# Patient Record
Sex: MALE | Race: WHITE | NOT HISPANIC OR LATINO | Employment: FULL TIME | URBAN - NONMETROPOLITAN AREA
[De-identification: names, ages, dates, MRNs, and addresses within clinical notes are randomized per-mention and may not be internally consistent; named-entity substitution may affect disease eponyms.]

---

## 2018-08-31 ENCOUNTER — OFFICE VISIT (OUTPATIENT)
Dept: ORTHOPEDIC SURGERY | Facility: CLINIC | Age: 58
End: 2018-08-31

## 2018-08-31 VITALS — BODY MASS INDEX: 25.47 KG/M2 | RESPIRATION RATE: 20 BRPM | WEIGHT: 188 LBS | HEIGHT: 72 IN

## 2018-08-31 DIAGNOSIS — M75.51 ACUTE SHOULDER BURSITIS, RIGHT: Primary | ICD-10-CM

## 2018-08-31 DIAGNOSIS — M75.81 ROTATOR CUFF TENDINITIS, RIGHT: ICD-10-CM

## 2018-08-31 DIAGNOSIS — M75.111 PARTIAL TEAR OF RIGHT ROTATOR CUFF: ICD-10-CM

## 2018-08-31 DIAGNOSIS — M19.019 AC JOINT ARTHROPATHY: ICD-10-CM

## 2018-08-31 PROCEDURE — 20610 DRAIN/INJ JOINT/BURSA W/O US: CPT | Performed by: PHYSICIAN ASSISTANT

## 2018-08-31 PROCEDURE — 99203 OFFICE O/P NEW LOW 30 MIN: CPT | Performed by: PHYSICIAN ASSISTANT

## 2018-08-31 RX ORDER — HYDROCODONE BITARTRATE AND ACETAMINOPHEN 7.5; 325 MG/1; MG/1
1 TABLET ORAL EVERY 6 HOURS PRN
COMMUNITY
End: 2022-01-13

## 2018-08-31 RX ORDER — OMEPRAZOLE 20 MG/1
20 CAPSULE, DELAYED RELEASE ORAL DAILY
COMMUNITY
End: 2022-01-13 | Stop reason: DRUGHIGH

## 2018-08-31 RX ORDER — LIDOCAINE HYDROCHLORIDE 10 MG/ML
2 INJECTION, SOLUTION INFILTRATION; PERINEURAL
Status: COMPLETED | OUTPATIENT
Start: 2018-08-31 | End: 2018-08-31

## 2018-08-31 RX ORDER — METHYLPREDNISOLONE ACETATE 40 MG/ML
40 INJECTION, SUSPENSION INTRA-ARTICULAR; INTRALESIONAL; INTRAMUSCULAR; SOFT TISSUE
Status: COMPLETED | OUTPATIENT
Start: 2018-08-31 | End: 2018-08-31

## 2018-08-31 RX ADMIN — LIDOCAINE HYDROCHLORIDE 2 ML: 10 INJECTION, SOLUTION INFILTRATION; PERINEURAL at 11:34

## 2018-08-31 RX ADMIN — METHYLPREDNISOLONE ACETATE 40 MG: 40 INJECTION, SUSPENSION INTRA-ARTICULAR; INTRALESIONAL; INTRAMUSCULAR; SOFT TISSUE at 11:34

## 2018-10-11 ENCOUNTER — OFFICE VISIT (OUTPATIENT)
Dept: ORTHOPEDIC SURGERY | Facility: CLINIC | Age: 58
End: 2018-10-11

## 2018-10-11 VITALS — WEIGHT: 192.2 LBS | RESPIRATION RATE: 18 BRPM | BODY MASS INDEX: 26.03 KG/M2 | HEIGHT: 72 IN

## 2018-10-11 DIAGNOSIS — M19.019 AC JOINT ARTHROPATHY: ICD-10-CM

## 2018-10-11 DIAGNOSIS — M75.51 ACUTE SHOULDER BURSITIS, RIGHT: Primary | ICD-10-CM

## 2018-10-11 DIAGNOSIS — M75.111 PARTIAL TEAR OF RIGHT ROTATOR CUFF: ICD-10-CM

## 2018-10-11 DIAGNOSIS — M75.81 ROTATOR CUFF TENDINITIS, RIGHT: ICD-10-CM

## 2018-10-11 PROCEDURE — 99213 OFFICE O/P EST LOW 20 MIN: CPT | Performed by: PHYSICIAN ASSISTANT

## 2018-10-11 NOTE — PROGRESS NOTES
Subjective   Patient ID: Alec Chilel is a 58 y.o. right hand dominant male  Follow-up of the Right Shoulder (Patient here today to follow up right shoulder pain. He is still doing therapy with [facility]. C/O severe pain when reaching behind or raising up and back with his right arm)         History of Present Illness  Patient is following up at his scheduled appointment in regards to right shoulder pain.  Patient has been dealing with shoulder pain since July 2018.  While Working as an  at work he raised his right arm above his head and felt an immediate painful popping sensation.  He has since tried formal physical therapy as well as a cortisone injection into the right shoulder.  He states he is minimally improved but still has significant pain with certain movements of the arm.                                                 Past Medical History:   Diagnosis Date   • Rotator cuff syndrome         Past Surgical History:   Procedure Laterality Date   • SHOULDER SURGERY         No family history on file.    Social History     Social History   • Marital status: Single     Spouse name: N/A   • Number of children: N/A   • Years of education: N/A     Occupational History   • Not on file.     Social History Main Topics   • Smoking status: Current Every Day Smoker   • Smokeless tobacco: Never Used   • Alcohol use No   • Drug use: No   • Sexual activity: Defer     Other Topics Concern   • Not on file     Social History Narrative   • No narrative on file         Current Outpatient Prescriptions:   •  omeprazole (priLOSEC) 20 MG capsule, Take 20 mg by mouth Daily., Disp: , Rfl:   •  HYDROcodone-acetaminophen (NORCO) 7.5-325 MG per tablet, Take 1 tablet by mouth Every 6 (Six) Hours As Needed for Moderate Pain ., Disp: , Rfl:     Allergies   Allergen Reactions   • Penicillins Hives       Review of Systems   Constitutional: Negative for fever.   HENT: Negative for voice change.    Eyes: Negative for visual  "disturbance.   Respiratory: Negative for shortness of breath.    Cardiovascular: Negative for chest pain.   Gastrointestinal: Negative for abdominal distention and abdominal pain.   Genitourinary: Negative for dysuria.   Musculoskeletal: Positive for arthralgias. Negative for gait problem and joint swelling.   Skin: Negative for rash.   Neurological: Negative for speech difficulty.   Hematological: Does not bruise/bleed easily.   Psychiatric/Behavioral: Negative for confusion.       Objective   Resp 18   Ht 182.9 cm (72\")   Wt 87.2 kg (192 lb 3.2 oz)   BMI 26.07 kg/m²    Physical Exam   Constitutional: He is oriented to person, place, and time. He appears well-nourished.   Cardiovascular: Normal rate.    Pulmonary/Chest: Effort normal. No respiratory distress.   Abdominal: He exhibits no distension.   Musculoskeletal:        Right shoulder: He exhibits decreased range of motion (painful abduction) and tenderness. He exhibits no bony tenderness.        Right hand: He exhibits normal capillary refill and no swelling. Normal sensation noted. Normal strength noted. He exhibits no thumb/finger opposition.   Neurological: He is alert and oriented to person, place, and time.   Skin: Capillary refill takes less than 2 seconds. No rash noted.   Psychiatric: He has a normal mood and affect.   Vitals reviewed.    Right Shoulder Exam     Range of Motion   Active Abduction: 120   Forward Flexion: 130   Internal Rotation 0 degrees: Sacrum     Muscle Strength   The patient has normal right shoulder strength.    Tests   Cross Arm: positive  Hawkin's test: positive  Impingement: positive    Other   Erythema: absent  Sensation: normal  Pulse: present             Neurologic Exam     Mental Status   Oriented to person, place, and time.      Right Shoulder Exam     Muscle Strength   Normal right shoulder strength               Assessment/Plan   Independent Review of Radiographic Studies:    Patient did have an MRI at [place] [address] " health.  It did reveal a partial thickness tear of the rotator cuff tendon, significant acromioclavicular joint arthropathy There is also subacromial bursitis.      Procedures       There are no diagnoses linked to this encounter.   Orthopedic activities reviewed and patient expressed appreciation  Discussion of orthopedic goals  Risk, benefits, and merits of treatment alternatives reviewed with the patient and questions answered  The nature of the proposed surgery reviewed with the patient including risks, benefits, rehabilitation, recovery timeframe, and outcome expectations    Recommendations/Plan:  Surgery: Surgery proposed at this visit as noted.  Patient is encouraged to call or return for any issues or concerns.    PLAN: Right shoulder arthroscopy, subacromial decompression, distal clavicle excision and acromioplasty.  Possible rotator cuff repair with related procedures    Patient agreeable to call or return sooner for any concerns.

## 2018-11-13 ENCOUNTER — PREP FOR SURGERY (OUTPATIENT)
Dept: OTHER | Facility: HOSPITAL | Age: 58
End: 2018-11-13

## 2018-11-13 DIAGNOSIS — M75.41 SHOULDER IMPINGEMENT SYNDROME, RIGHT: Primary | ICD-10-CM

## 2018-11-13 RX ORDER — CLINDAMYCIN PHOSPHATE 900 MG/50ML
900 INJECTION, SOLUTION INTRAVENOUS
Status: CANCELLED | OUTPATIENT
Start: 2018-12-13 | End: 2018-12-14

## 2018-11-29 ENCOUNTER — HOSPITAL ENCOUNTER (OUTPATIENT)
Dept: GENERAL RADIOLOGY | Facility: HOSPITAL | Age: 58
Discharge: HOME | End: 2018-11-29
Admitting: INTERNAL MEDICINE

## 2018-11-29 ENCOUNTER — OFFICE VISIT (OUTPATIENT)
Dept: ORTHOPEDIC SURGERY | Facility: CLINIC | Age: 58
End: 2018-11-29

## 2018-11-29 ENCOUNTER — APPOINTMENT (OUTPATIENT)
Dept: PREADMISSION TESTING | Facility: HOSPITAL | Age: 58
End: 2018-11-29

## 2018-11-29 VITALS — RESPIRATION RATE: 18 BRPM | WEIGHT: 197 LBS | HEIGHT: 72 IN | BODY MASS INDEX: 26.68 KG/M2

## 2018-11-29 VITALS
HEART RATE: 74 BPM | SYSTOLIC BLOOD PRESSURE: 112 MMHG | BODY MASS INDEX: 26.68 KG/M2 | OXYGEN SATURATION: 98 % | DIASTOLIC BLOOD PRESSURE: 67 MMHG | WEIGHT: 197 LBS | HEIGHT: 72 IN

## 2018-11-29 DIAGNOSIS — M75.51 ACUTE SHOULDER BURSITIS, RIGHT: Primary | ICD-10-CM

## 2018-11-29 DIAGNOSIS — M19.019 AC JOINT ARTHROPATHY: ICD-10-CM

## 2018-11-29 DIAGNOSIS — M75.41 SHOULDER IMPINGEMENT SYNDROME, RIGHT: ICD-10-CM

## 2018-11-29 DIAGNOSIS — M75.111 PARTIAL TEAR OF RIGHT ROTATOR CUFF: ICD-10-CM

## 2018-11-29 DIAGNOSIS — M75.81 ROTATOR CUFF TENDINITIS, RIGHT: ICD-10-CM

## 2018-11-29 LAB
ALBUMIN SERPL-MCNC: 4.9 G/DL (ref 3.5–5)
ALBUMIN/GLOB SERPL: 1.8 G/DL (ref 1–2)
ALP SERPL-CCNC: 62 U/L (ref 38–126)
ALT SERPL W P-5'-P-CCNC: 55 U/L (ref 13–69)
ANION GAP SERPL CALCULATED.3IONS-SCNC: 8.1 MMOL/L (ref 10–20)
AST SERPL-CCNC: 34 U/L (ref 15–46)
BASOPHILS # BLD AUTO: 0.05 10*3/MM3 (ref 0–0.2)
BASOPHILS NFR BLD AUTO: 0.7 % (ref 0–2.5)
BILIRUB SERPL-MCNC: 0.6 MG/DL (ref 0.2–1.3)
BUN BLD-MCNC: 19 MG/DL (ref 7–20)
BUN/CREAT SERPL: 21.1 (ref 6.3–21.9)
CALCIUM SPEC-SCNC: 9.4 MG/DL (ref 8.4–10.2)
CHLORIDE SERPL-SCNC: 105 MMOL/L (ref 98–107)
CO2 SERPL-SCNC: 29 MMOL/L (ref 26–30)
CREAT BLD-MCNC: 0.9 MG/DL (ref 0.6–1.3)
DEPRECATED RDW RBC AUTO: 41.1 FL (ref 37–54)
EOSINOPHIL # BLD AUTO: 0.32 10*3/MM3 (ref 0–0.7)
EOSINOPHIL NFR BLD AUTO: 4.3 % (ref 0–7)
ERYTHROCYTE [DISTWIDTH] IN BLOOD BY AUTOMATED COUNT: 11.9 % (ref 11.5–14.5)
GFR SERPL CREATININE-BSD FRML MDRD: 87 ML/MIN/1.73
GLOBULIN UR ELPH-MCNC: 2.8 GM/DL
GLUCOSE BLD-MCNC: 101 MG/DL (ref 74–98)
HCT VFR BLD AUTO: 45.3 % (ref 42–52)
HGB BLD-MCNC: 15.8 G/DL (ref 14–18)
IMM GRANULOCYTES # BLD: 0.04 10*3/MM3 (ref 0–0.06)
IMM GRANULOCYTES NFR BLD: 0.5 % (ref 0–0.6)
LYMPHOCYTES # BLD AUTO: 1.78 10*3/MM3 (ref 0.6–3.4)
LYMPHOCYTES NFR BLD AUTO: 24.1 % (ref 10–50)
MCH RBC QN AUTO: 32.6 PG (ref 27–31)
MCHC RBC AUTO-ENTMCNC: 34.9 G/DL (ref 30–37)
MCV RBC AUTO: 93.6 FL (ref 80–94)
MONOCYTES # BLD AUTO: 1.06 10*3/MM3 (ref 0–0.9)
MONOCYTES NFR BLD AUTO: 14.3 % (ref 0–12)
NEUTROPHILS # BLD AUTO: 4.14 10*3/MM3 (ref 2–6.9)
NEUTROPHILS NFR BLD AUTO: 56.1 % (ref 37–80)
NRBC BLD MANUAL-RTO: 0 /100 WBC (ref 0–0)
PLATELET # BLD AUTO: 171 10*3/MM3 (ref 130–400)
PMV BLD AUTO: 9.1 FL (ref 6–12)
POTASSIUM BLD-SCNC: 4.1 MMOL/L (ref 3.5–5.1)
PROT SERPL-MCNC: 7.7 G/DL (ref 6.3–8.2)
RBC # BLD AUTO: 4.84 10*6/MM3 (ref 4.7–6.1)
SODIUM BLD-SCNC: 138 MMOL/L (ref 137–145)
WBC NRBC COR # BLD: 7.39 10*3/MM3 (ref 4.8–10.8)

## 2018-11-29 PROCEDURE — S0260 H&P FOR SURGERY: HCPCS | Performed by: PHYSICIAN ASSISTANT

## 2018-11-29 PROCEDURE — 71046 X-RAY EXAM CHEST 2 VIEWS: CPT

## 2018-11-29 PROCEDURE — 85025 COMPLETE CBC W/AUTO DIFF WBC: CPT | Performed by: PHYSICIAN ASSISTANT

## 2018-11-29 PROCEDURE — 93005 ELECTROCARDIOGRAM TRACING: CPT | Performed by: PHYSICIAN ASSISTANT

## 2018-11-29 PROCEDURE — 87081 CULTURE SCREEN ONLY: CPT | Performed by: PHYSICIAN ASSISTANT

## 2018-11-29 PROCEDURE — 80053 COMPREHEN METABOLIC PANEL: CPT | Performed by: PHYSICIAN ASSISTANT

## 2018-11-29 PROCEDURE — 36415 COLL VENOUS BLD VENIPUNCTURE: CPT

## 2018-11-29 NOTE — PROGRESS NOTES
Subjective   Patient ID: Alec Chilel is a 58 y.o. right hand dominant male  Pain and Pre-op Exam of the Right Shoulder (ATS, SAD, DCE, Acromioplasty scheduled 12/13/18)         History of Present Illness  Patient presents for his preoperative evaluation regarding right shoulder arthroscopy.  Patient has been dealing with right shoulder pain since July 2018.  While working as an  he raced his right arm above his head when he developed an immediate painful popping sensation.  Since the injury he has tried formal physical therapy, cortisone injection, anti-inflammatories and his chronic hydrocodone with no relief.                                                   Past Medical History:   Diagnosis Date   • Rotator cuff syndrome         Past Surgical History:   Procedure Laterality Date   • SHOULDER SURGERY         History reviewed. No pertinent family history.    Social History     Socioeconomic History   • Marital status: Single     Spouse name: Not on file   • Number of children: Not on file   • Years of education: Not on file   • Highest education level: Not on file   Social Needs   • Financial resource strain: Not on file   • Food insecurity - worry: Not on file   • Food insecurity - inability: Not on file   • Transportation needs - medical: Not on file   • Transportation needs - non-medical: Not on file   Occupational History   • Not on file   Tobacco Use   • Smoking status: Current Every Day Smoker   • Smokeless tobacco: Never Used   Substance and Sexual Activity   • Alcohol use: No   • Drug use: No   • Sexual activity: Defer   Other Topics Concern   • Not on file   Social History Narrative   • Not on file         Current Outpatient Medications:   •  HYDROcodone-acetaminophen (NORCO) 7.5-325 MG per tablet, Take 1 tablet by mouth Every 6 (Six) Hours As Needed for Moderate Pain ., Disp: , Rfl:   •  omeprazole (priLOSEC) 20 MG capsule, Take 20 mg by mouth Daily., Disp: , Rfl:     Allergies   Allergen  "Reactions   • Penicillins Hives       Review of Systems   Constitutional: Negative.    Musculoskeletal: Positive for arthralgias (right shoulder).   Skin: Negative.    Allergic/Immunologic: Negative.        Objective   Resp 18   Ht 182.9 cm (72\")   Wt 89.4 kg (197 lb)   BMI 26.72 kg/m²    Physical Exam   Constitutional: He is oriented to person, place, and time. He appears well-nourished.   Eyes: Conjunctivae are normal.   Neck: No tracheal deviation present.   Cardiovascular: Normal rate.   Pulmonary/Chest: Effort normal.   Abdominal: He exhibits no distension.   Musculoskeletal:        Right shoulder: He exhibits decreased range of motion, tenderness and crepitus.   Neurological: He is alert and oriented to person, place, and time.   Skin: Capillary refill takes less than 2 seconds.   Psychiatric: He has a normal mood and affect.   Vitals reviewed.    Right Shoulder Exam     Range of Motion   Active abduction: 120   Right shoulder forward flexion: 125.     Muscle Strength   The patient has normal right shoulder strength.             Neurologic Exam     Mental Status   Oriented to person, place, and time.              Assessment/Plan   Independent Review of Radiographic Studies:    No new imaging done today.  Reviewed with patient at a prior visit.  MRI of the right shoulder from [facility] did reveal partial thickness tear of the rotator cuff tendon, significant acromioclavicular joint arthropathy, subacromial bursitis    Procedures       Alec was seen today for pain and pre-op exam.    Diagnoses and all orders for this visit:    Acute shoulder bursitis, right    Rotator cuff tendinitis, right    Partial tear of right rotator cuff    AC joint arthropathy       Orthopedic activities reviewed and patient expressed appreciation  Discussion of orthopedic goals  Risk, benefits, and merits of treatment alternatives reviewed with the patient and questions answered  The nature of the proposed surgery reviewed " with the patient including risks, benefits, rehabilitation, recovery timeframe, and outcome expectations    Recommendations/Plan:  Surgery: Surgery proposed at this visit as noted.  Patient is encouraged to call or return for any issues or concerns.     Patient states he does have hydrocodone 7.5 mg which he can take up to 3 times per day but for the last few months he has only taken 1 pill twice a day.  He understands that we will have him take the medication prescribed from his pain management doctor 3 times per day and we will add anti-inflammatories as needed along with muscle relaxers  PLAN: Right shoulder arthroscopy, subacromial decompression, distal clavicle excision, acromioplasty with related procedures.  We'll explore rotator cuff tendons and repair if warranted  Patient agreeable to call or return sooner for any concerns.

## 2018-12-02 LAB — MRSA SPEC QL CULT: NORMAL

## 2018-12-13 ENCOUNTER — ANESTHESIA EVENT (OUTPATIENT)
Dept: PERIOP | Facility: HOSPITAL | Age: 58
End: 2018-12-13

## 2018-12-13 ENCOUNTER — ANESTHESIA (OUTPATIENT)
Dept: PERIOP | Facility: HOSPITAL | Age: 58
End: 2018-12-13

## 2018-12-13 ENCOUNTER — HOSPITAL ENCOUNTER (OUTPATIENT)
Facility: HOSPITAL | Age: 58
Setting detail: HOSPITAL OUTPATIENT SURGERY
Discharge: HOME | End: 2018-12-13
Attending: ORTHOPAEDIC SURGERY | Admitting: ORTHOPAEDIC SURGERY

## 2018-12-13 VITALS
DIASTOLIC BLOOD PRESSURE: 58 MMHG | TEMPERATURE: 97.3 F | SYSTOLIC BLOOD PRESSURE: 155 MMHG | HEART RATE: 64 BPM | RESPIRATION RATE: 18 BRPM | OXYGEN SATURATION: 95 %

## 2018-12-13 PROCEDURE — 25010000002 PROPOFOL 200 MG/20ML EMULSION: Performed by: NURSE ANESTHETIST, CERTIFIED REGISTERED

## 2018-12-13 PROCEDURE — C1713 ANCHOR/SCREW BN/BN,TIS/BN: HCPCS | Performed by: ORTHOPAEDIC SURGERY

## 2018-12-13 PROCEDURE — 25010000002 DEXAMETHASONE PER 1 MG: Performed by: NURSE ANESTHETIST, CERTIFIED REGISTERED

## 2018-12-13 PROCEDURE — 25010000002 ONDANSETRON PER 1 MG: Performed by: NURSE ANESTHETIST, CERTIFIED REGISTERED

## 2018-12-13 PROCEDURE — 23412 REPAIR ROTATOR CUFF CHRONIC: CPT | Performed by: ORTHOPAEDIC SURGERY

## 2018-12-13 PROCEDURE — 25010000002 EPINEPHRINE PER 0.1 MG: Performed by: ORTHOPAEDIC SURGERY

## 2018-12-13 PROCEDURE — 25010000002 FENTANYL CITRATE (PF) 250 MCG/5ML SOLUTION: Performed by: NURSE ANESTHETIST, CERTIFIED REGISTERED

## 2018-12-13 PROCEDURE — 25010000002 MIDAZOLAM PER 1 MG: Performed by: NURSE ANESTHETIST, CERTIFIED REGISTERED

## 2018-12-13 PROCEDURE — 25010000002 FENTANYL CITRATE (PF) 100 MCG/2ML SOLUTION: Performed by: NURSE ANESTHETIST, CERTIFIED REGISTERED

## 2018-12-13 DEVICE — HEALIX BR ANCHOR W/ORTHOCORD TCP/PLGA ABSORBABLE ANCHOR (1) VIOLET (1) BLUE STRAND, SIZE 2 (5 METRIC) ORTHOCORD BRAIDED COMPOSITE SUTURE, 36 INCHES (91CM) 4.5MM
Type: IMPLANTABLE DEVICE | Site: SHOULDER | Status: FUNCTIONAL
Brand: ORTHOCORD HEALIX BR

## 2018-12-13 RX ORDER — PROMETHAZINE HYDROCHLORIDE 25 MG/ML
6.25 INJECTION, SOLUTION INTRAMUSCULAR; INTRAVENOUS ONCE AS NEEDED
Status: DISCONTINUED | OUTPATIENT
Start: 2018-12-13 | End: 2018-12-13 | Stop reason: HOSPADM

## 2018-12-13 RX ORDER — BUPIVACAINE HYDROCHLORIDE 5 MG/ML
INJECTION, SOLUTION EPIDURAL; INTRACAUDAL
Status: COMPLETED | OUTPATIENT
Start: 2018-12-13 | End: 2018-12-13

## 2018-12-13 RX ORDER — DEXAMETHASONE SODIUM PHOSPHATE 10 MG/ML
INJECTION, SOLUTION INTRAMUSCULAR; INTRAVENOUS
Status: DISCONTINUED
Start: 2018-12-13 | End: 2018-12-13 | Stop reason: HOSPADM

## 2018-12-13 RX ORDER — MIDAZOLAM HYDROCHLORIDE 1 MG/ML
INJECTION INTRAMUSCULAR; INTRAVENOUS
Status: COMPLETED
Start: 2018-12-13 | End: 2018-12-13

## 2018-12-13 RX ORDER — ROCURONIUM BROMIDE 10 MG/ML
INJECTION, SOLUTION INTRAVENOUS AS NEEDED
Status: DISCONTINUED | OUTPATIENT
Start: 2018-12-13 | End: 2018-12-13 | Stop reason: SURG

## 2018-12-13 RX ORDER — MIDAZOLAM HYDROCHLORIDE 1 MG/ML
INJECTION INTRAMUSCULAR; INTRAVENOUS AS NEEDED
Status: DISCONTINUED | OUTPATIENT
Start: 2018-12-13 | End: 2018-12-13 | Stop reason: SURG

## 2018-12-13 RX ORDER — LORAZEPAM 2 MG/ML
0.5 INJECTION INTRAMUSCULAR ONCE
Status: DISCONTINUED | OUTPATIENT
Start: 2018-12-13 | End: 2018-12-13 | Stop reason: HOSPADM

## 2018-12-13 RX ORDER — SODIUM CHLORIDE 0.9 % (FLUSH) 0.9 %
3 SYRINGE (ML) INJECTION AS NEEDED
Status: DISCONTINUED | OUTPATIENT
Start: 2018-12-13 | End: 2018-12-13 | Stop reason: HOSPADM

## 2018-12-13 RX ORDER — LIDOCAINE HYDROCHLORIDE 20 MG/ML
INJECTION, SOLUTION EPIDURAL; INFILTRATION; INTRACAUDAL; PERINEURAL
Status: COMPLETED | OUTPATIENT
Start: 2018-12-13 | End: 2018-12-13

## 2018-12-13 RX ORDER — FENTANYL CITRATE 50 UG/ML
INJECTION, SOLUTION INTRAMUSCULAR; INTRAVENOUS AS NEEDED
Status: DISCONTINUED | OUTPATIENT
Start: 2018-12-13 | End: 2018-12-13 | Stop reason: SURG

## 2018-12-13 RX ORDER — DEXAMETHASONE SODIUM PHOSPHATE 4 MG/ML
INJECTION, SOLUTION INTRA-ARTICULAR; INTRALESIONAL; INTRAMUSCULAR; INTRAVENOUS; SOFT TISSUE AS NEEDED
Status: DISCONTINUED | OUTPATIENT
Start: 2018-12-13 | End: 2018-12-13 | Stop reason: SURG

## 2018-12-13 RX ORDER — LIDOCAINE HYDROCHLORIDE AND EPINEPHRINE 10; 10 MG/ML; UG/ML
INJECTION, SOLUTION INFILTRATION; PERINEURAL AS NEEDED
Status: DISCONTINUED | OUTPATIENT
Start: 2018-12-13 | End: 2018-12-13 | Stop reason: HOSPADM

## 2018-12-13 RX ORDER — BUPIVACAINE HCL/0.9 % NACL/PF 0.125 %
6 PREFILLED PUMP RESERVOIR EPIDURAL CONTINUOUS
Status: DISCONTINUED | OUTPATIENT
Start: 2018-12-13 | End: 2018-12-13 | Stop reason: HOSPADM

## 2018-12-13 RX ORDER — SODIUM CHLORIDE, SODIUM LACTATE, POTASSIUM CHLORIDE, CALCIUM CHLORIDE 600; 310; 30; 20 MG/100ML; MG/100ML; MG/100ML; MG/100ML
1000 INJECTION, SOLUTION INTRAVENOUS CONTINUOUS
Status: DISCONTINUED | OUTPATIENT
Start: 2018-12-13 | End: 2018-12-13 | Stop reason: HOSPADM

## 2018-12-13 RX ORDER — NEOSTIGMINE METHYLSULFATE 5 MG/5 ML
SYRINGE (ML) INTRAVENOUS AS NEEDED
Status: DISCONTINUED | OUTPATIENT
Start: 2018-12-13 | End: 2018-12-13 | Stop reason: SURG

## 2018-12-13 RX ORDER — ONDANSETRON 2 MG/ML
INJECTION INTRAMUSCULAR; INTRAVENOUS AS NEEDED
Status: DISCONTINUED | OUTPATIENT
Start: 2018-12-13 | End: 2018-12-13 | Stop reason: SURG

## 2018-12-13 RX ORDER — ALBUTEROL SULFATE 2.5 MG/3ML
2.5 SOLUTION RESPIRATORY (INHALATION) ONCE AS NEEDED
Status: DISCONTINUED | OUTPATIENT
Start: 2018-12-13 | End: 2018-12-13 | Stop reason: HOSPADM

## 2018-12-13 RX ORDER — CLINDAMYCIN PHOSPHATE 900 MG/50ML
900 INJECTION, SOLUTION INTRAVENOUS
Status: COMPLETED | OUTPATIENT
Start: 2018-12-13 | End: 2018-12-13

## 2018-12-13 RX ORDER — MEPERIDINE HYDROCHLORIDE 50 MG/ML
12.5 INJECTION INTRAMUSCULAR; INTRAVENOUS; SUBCUTANEOUS
Status: DISCONTINUED | OUTPATIENT
Start: 2018-12-13 | End: 2018-12-13 | Stop reason: HOSPADM

## 2018-12-13 RX ORDER — PROPOFOL 10 MG/ML
INJECTION, EMULSION INTRAVENOUS AS NEEDED
Status: DISCONTINUED | OUTPATIENT
Start: 2018-12-13 | End: 2018-12-13 | Stop reason: SURG

## 2018-12-13 RX ORDER — KETAMINE HYDROCHLORIDE 50 MG/ML
INJECTION, SOLUTION, CONCENTRATE INTRAMUSCULAR; INTRAVENOUS AS NEEDED
Status: DISCONTINUED | OUTPATIENT
Start: 2018-12-13 | End: 2018-12-13 | Stop reason: SURG

## 2018-12-13 RX ORDER — HYDROCODONE BITARTRATE AND ACETAMINOPHEN 7.5; 325 MG/1; MG/1
1 TABLET ORAL EVERY 6 HOURS PRN
Qty: 28 TABLET | Refills: 0 | OUTPATIENT
Start: 2018-12-13 | End: 2018-12-13 | Stop reason: HOSPADM

## 2018-12-13 RX ORDER — BUPIVACAINE HCL/0.9 % NACL/PF 0.125 %
PLASTIC BAG, INJECTION (ML) EPIDURAL AS NEEDED
Status: DISCONTINUED | OUTPATIENT
Start: 2018-12-13 | End: 2018-12-13 | Stop reason: SURG

## 2018-12-13 RX ORDER — PROMETHAZINE HYDROCHLORIDE 25 MG/1
25 TABLET ORAL ONCE AS NEEDED
Status: DISCONTINUED | OUTPATIENT
Start: 2018-12-13 | End: 2018-12-13 | Stop reason: HOSPADM

## 2018-12-13 RX ORDER — ONDANSETRON 2 MG/ML
4 INJECTION INTRAMUSCULAR; INTRAVENOUS ONCE AS NEEDED
Status: DISCONTINUED | OUTPATIENT
Start: 2018-12-13 | End: 2018-12-13 | Stop reason: HOSPADM

## 2018-12-13 RX ORDER — BUPIVACAINE HYDROCHLORIDE 5 MG/ML
INJECTION, SOLUTION EPIDURAL; INTRACAUDAL
Status: COMPLETED
Start: 2018-12-13 | End: 2018-12-13

## 2018-12-13 RX ORDER — GLYCOPYRROLATE 0.2 MG/ML
INJECTION INTRAMUSCULAR; INTRAVENOUS AS NEEDED
Status: DISCONTINUED | OUTPATIENT
Start: 2018-12-13 | End: 2018-12-13 | Stop reason: SURG

## 2018-12-13 RX ORDER — EPINEPHRINE 1 MG/ML
INJECTION, SOLUTION, CONCENTRATE INTRAVENOUS AS NEEDED
Status: DISCONTINUED | OUTPATIENT
Start: 2018-12-13 | End: 2018-12-13 | Stop reason: HOSPADM

## 2018-12-13 RX ORDER — CLINDAMYCIN PHOSPHATE 150 MG/ML
INJECTION, SOLUTION INTRAVENOUS AS NEEDED
Status: DISCONTINUED | OUTPATIENT
Start: 2018-12-13 | End: 2018-12-13 | Stop reason: HOSPADM

## 2018-12-13 RX ORDER — ESMOLOL HYDROCHLORIDE 10 MG/ML
INJECTION INTRAVENOUS AS NEEDED
Status: DISCONTINUED | OUTPATIENT
Start: 2018-12-13 | End: 2018-12-13 | Stop reason: SURG

## 2018-12-13 RX ORDER — PROMETHAZINE HYDROCHLORIDE 25 MG/1
25 SUPPOSITORY RECTAL ONCE AS NEEDED
Status: DISCONTINUED | OUTPATIENT
Start: 2018-12-13 | End: 2018-12-13 | Stop reason: HOSPADM

## 2018-12-13 RX ADMIN — FENTANYL CITRATE 50 MCG: 50 INJECTION, SOLUTION INTRAMUSCULAR; INTRAVENOUS at 11:06

## 2018-12-13 RX ADMIN — MIDAZOLAM HYDROCHLORIDE 2 MG: 1 INJECTION, SOLUTION INTRAMUSCULAR; INTRAVENOUS at 10:20

## 2018-12-13 RX ADMIN — Medication 6 ML/HR: at 12:40

## 2018-12-13 RX ADMIN — KETAMINE HYDROCHLORIDE 50 MG: 50 INJECTION, SOLUTION INTRAMUSCULAR; INTRAVENOUS at 10:20

## 2018-12-13 RX ADMIN — PROPOFOL 20 MG: 10 INJECTION, EMULSION INTRAVENOUS at 10:25

## 2018-12-13 RX ADMIN — FAMOTIDINE 20 MG: 10 INJECTION, SOLUTION INTRAVENOUS at 08:12

## 2018-12-13 RX ADMIN — GLYCOPYRROLATE 0.4 MG: 0.2 INJECTION, SOLUTION INTRAMUSCULAR; INTRAVENOUS at 11:43

## 2018-12-13 RX ADMIN — ROCURONIUM BROMIDE 30 MG: 10 INJECTION INTRAVENOUS at 10:20

## 2018-12-13 RX ADMIN — DEXAMETHASONE SODIUM PHOSPHATE 8 MG: 4 INJECTION, SOLUTION INTRAMUSCULAR; INTRAVENOUS at 10:20

## 2018-12-13 RX ADMIN — SODIUM CHLORIDE, POTASSIUM CHLORIDE, SODIUM LACTATE AND CALCIUM CHLORIDE: 600; 310; 30; 20 INJECTION, SOLUTION INTRAVENOUS at 11:05

## 2018-12-13 RX ADMIN — Medication 150 MCG: at 11:35

## 2018-12-13 RX ADMIN — FENTANYL CITRATE 100 MCG: 50 INJECTION, SOLUTION INTRAMUSCULAR; INTRAVENOUS at 10:20

## 2018-12-13 RX ADMIN — KETAMINE HYDROCHLORIDE 50 MG: 50 INJECTION, SOLUTION INTRAMUSCULAR; INTRAVENOUS at 10:29

## 2018-12-13 RX ADMIN — PROPOFOL 180 MG: 10 INJECTION, EMULSION INTRAVENOUS at 10:20

## 2018-12-13 RX ADMIN — LIDOCAINE HYDROCHLORIDE 1 ML: 20 INJECTION, SOLUTION EPIDURAL; INFILTRATION; INTRACAUDAL; PERINEURAL at 09:55

## 2018-12-13 RX ADMIN — FENTANYL CITRATE 100 MCG: 50 INJECTION, SOLUTION INTRAMUSCULAR; INTRAVENOUS at 10:50

## 2018-12-13 RX ADMIN — FENTANYL CITRATE 100 MCG: 50 INJECTION, SOLUTION INTRAMUSCULAR; INTRAVENOUS at 11:48

## 2018-12-13 RX ADMIN — ONDANSETRON 4 MG: 2 INJECTION INTRAMUSCULAR; INTRAVENOUS at 10:20

## 2018-12-13 RX ADMIN — FENTANYL CITRATE 100 MCG: 50 INJECTION, SOLUTION INTRAMUSCULAR; INTRAVENOUS at 10:24

## 2018-12-13 RX ADMIN — MIDAZOLAM HYDROCHLORIDE 2 MG: 1 INJECTION, SOLUTION INTRAMUSCULAR; INTRAVENOUS at 09:54

## 2018-12-13 RX ADMIN — BUPIVACAINE HYDROCHLORIDE 20 ML: 5 INJECTION, SOLUTION EPIDURAL; INTRACAUDAL; PERINEURAL at 09:55

## 2018-12-13 RX ADMIN — SODIUM CHLORIDE, POTASSIUM CHLORIDE, SODIUM LACTATE AND CALCIUM CHLORIDE 1000 ML: 600; 310; 30; 20 INJECTION, SOLUTION INTRAVENOUS at 08:11

## 2018-12-13 RX ADMIN — ESMOLOL HYDROCHLORIDE 10 MG: 10 INJECTION, SOLUTION INTRAVENOUS at 11:45

## 2018-12-13 RX ADMIN — CLINDAMYCIN PHOSPHATE 900 MG: 900 INJECTION, SOLUTION INTRAVENOUS at 10:10

## 2018-12-13 RX ADMIN — ESMOLOL HYDROCHLORIDE 10 MG: 10 INJECTION, SOLUTION INTRAVENOUS at 10:50

## 2018-12-13 RX ADMIN — Medication 3.5 MG: at 11:43

## 2018-12-13 NOTE — DISCHARGE INSTRUCTIONS
"ON-Q PATIENT INSTRUCTIONS    You have had regional anesthesia with a catheter as part of your anesthetic care.    Because of this your extremity may be numb and very weak.  You must protect   your extremity.  Wear the sling if your surgeon has given you one.  Take care to   avoid hot, very cold or sharp items.  As the block wears off, you may have a tingling   or a \"pins and needles\" sensation in your arm and hand.  This is normal.    The ON-Q pain pump is infusing medicine all the time which will help control your   pain as the block wears off.  Your extremity may not be as numb after the first 12 to   18 hours.    Do not tug on or kink the catheter.  Keep the insertion site into the skin and any   connections clean.    CALL ANESTHESIA  IMMEDIATELY FOR:     -A metallic taste in your mouth       -Ringing in the ears        -Persistent tremors or shakes or a seizure      -Redness, pain or swelling at the catheter insertion site    -A cold, dusky or dark extremity   -Severe pain and you can't move your fingers or toes    The ON-Q pain ball is initially set at _____  mL/hour. The black arrow at the top of the   dial points to the rate the medication is being delivered. Do not set the pump in between   the numbers as it will not work correctly. The white clamp must be open at all times.    If you are having pain, increase the pain ball rate  to 14 ml/hour for ONE hour.   Then return to your original rate, or if pain is not adequately relieved you may increase it by 2mL/hour.  If your extremity is too numb, you may turn down the pain ball 2 mL/hour every 2 hours.    Do not titrate down too fast; you may then experience pain.    You may also take the prescribed pain medication from your doctor.     The pain ball and catheter may stay in up to 4 days.    Leaking at the catheter site may occur.  The pain ball is still working if it's controlling your pain.    Reinforce the dressing with additional tegaderm.    When the pain " ball is empty it will be flat.  Remove the catheter by pulling off the dressing slowly   and pull the catheter out of the skin.  Confirm that the tip of the catheter is intact once removed.   If the catheter is stuck but not hurting, reposition your extremity and keep pulling slowly until   removed.  If the catheter is hurting and won't pull out,   CALL THE NUMBER BELOW:    DO NOT CUT THE CATHETER FOR ANY REASON    When treatment is completed, dispose of the catheter and pain ball.    PRIMARY CONTACT: Anesthesia Service   (Mon-Fri 7:00 AM-3:00 PM): [phone]    After 3:00 PM: [phone] (Tell the hospital  you have a pain pump and need  to speak with anesthesia)      Frequently Asked questions about Pain Blocks    1. What are the advantages of having a nerve block?    A nerve block decreases the pain after surgery and lessens the need for narcotics. Narcotics cause nausea, vomiting, sleepiness, constipation and delayed mobility.  2. Will the procedure hurt?   You will be given sedation for the procedure. We need you to be alert enough to follow instructions during the block.  3. Am I required to have a nerve block?    No, this is a service that we offer to improve comfort and reduce pain medication requirement following surgery. You can refuse to have a nerve block.      Single Injection    1. Is it normal for my extremity to be numb after 16 hours?  Yes.  Weakness and numbness can last 24 hours or more. If you are concerned call Anesthesia.     2. After shoulder surgery my eyelid on the same side as my surgery Is dropping. Is this normal?   The medication injected may contact nerves that affect your eyelid and cause drooping. This will wear off as the pain block wears. If you are concerned call Anesthesia.    3. After shoulder surgery it feels like it is hard to take a deep breath. Is this normal?   Yes.  This will go away as the medication for the block wears off. If you are extremely short of breath  call 911.      Continuous Infusion with OnQ Pump    1. Who do I call if I  have a question or concern about the OnQ pump?  s0cket [phone]  (24-hour product support)    2. Can I get the clear dressing wet when Itake a shower?  No. This dressing must remain dry or It will loosen and the catheter may come out.    3. Does the catheter need to be removed immediately after the pain ball is empty?   No. The empty pain pump can remain in place until it is convenient to be removed. However, It is important that the catheter does get removed as soon as possible after completion.    4. If I decide I don't like the catheter after it Is placed, do I have to wait for the pain ball to go flat before Ican remove it?   No the catheter can be removed at anytime . Once it is removed it cannot be replaced.    5. If I accidentally pull the catheter out, will I be causing any problems?   No. Be aware that the pain block will wear off in 2-4 hours so you must begin taking pain medication as prescribed.     6. How do I know if the medication Is infusing?   You pain ball will begin to shrink as the medication goes in. Then after 24 hours you will notice that the pain ball begins to get smaller.    7. What do I do If I notice clear or pink colored drainage collecting under the dressing?    Drainage around the catheter site is normal and can be clear, pink, and sometimes red. You can reinforce the dressing with anything that will absorb drainage.    8. When I remove the catheter should I expect some bleeding?   Yes. It is not uncommon for small amount of bleeding to occur after the catheter is removed. Apply direct pressure for 5 minutes and cover with a Band-Aid.    9. Will I need to take the pain medication ordered by my surgeon?   If you are going home on the day of surgery get your prescription filled. The pain ball will help decrease the need for pain medications but sometimes it is  necessary to take prescribed pain  medication. If you are staying in the hospital overnight, you must communicate with your nurse about your pain level.          I have received a copy these instructions.     __________________________________________________________       Patient Signature    *Please ensure that patient receives a copy and a signed copy is placed in chart*            Please follow all post op instructions and follow up appointment time from your physician's office included in your discharge packet.     Keep the affected extremity elevated above  level of the heart.  Use your ice pack as instructed, do not use continuously.  Use your sling as directed    Follow your physicians instructions as previously directed.Rest today    No pushing,pulling,tugging,heavy lifting, or strenuous activity   No major decision making,driving,or drinking alcoholic beverages for 24 hours due to the sedation you received  Always use good hand hygiene/washing technique  No driving on pain medication.    To assist you in voiding:  Drink plenty of fluids  Listen to running water while attempting to void.    If you are unable to urinate and you have an uncomfortable urge to void or it has been   6 hours since you were discharged, return to the Emergency Room.    Keep right arm/shoulder elevated in sling.    Apply ice to incision site, remove, and reapply in 2-3 hours.  Do not use ice continuously.    Follow Dr. Miller's instructions as directed.

## 2018-12-13 NOTE — OP NOTE
[address], [address]  [place]  [address] [phone]      OPERATIVE REPORT      PATIENT NAME:  Markell Chilel                            YOB: 1960       PREOP DIAGNOSIS:   Right shoulder impingement, subacromial bursitis, acromioclavicular osteoarthritis and rotator cuff tear.    POSTOP DIAGNOSIS:  Same    PROCEDURE:    Right shoulder diagnostic arthroscopy, subacromial decompression bursectomy, acromioplasty, distal clavicle excision and mini-open rotator cuff repair.    SURGEON:     Sekou Miller MD    OPERATIVE TEAM:  Circulator: Nell Perez RN  Scrub Person: Valdez Acosta; Tg Hernandez    ANESTHETIST:  ALEX: Bethel Stein CRNA    ANESTHESIA:   Anesthesia type not filed in the log.    ESTIM BLOOD LOSS:   10 ml    FINDINGS:     Medium size 2 cm mildly retracted complete supraspinatus     tear, type 1 degenerative anterosuperior labral tears, moderate     bursitis, type 3 acromion spur, hypertrophic acromioclavicular     joint advanced osteoarthritis with spurs.    SPECIMENS:    None.    IMPLANTS:      DePuy Healix BR 4.5mm rotator cuff screw anchor with     two Orthocord sutures.    DRAINS:     None.    COMPLICATIONS:    None.    DISPOSITION:    Stable to recovery.     INDICATIONS:     Shoulder pain, stiffness, weakness and dysfunction.    NARRATIVE:     Risks, benefits of proposed treatment and alternative options discussed and an informed consent for the elective surgical procedure obtained.  Risks discussed including but not limited to anesthesia, infection, nerve/vessel/tendon injury, fracture, DVT, PE, recurrent tear and further symptoms or limitations.  Goals outlined including the potential for relief of pain and improved shoulder function and activity tolerance.    Antibiotic prophylaxis was given.  Surgeon site marking and a time out were performed prior to the procedure.  Anesthesia was effective and well-tolerated.  The  patient was maintained in the modified reclined back beach chair position with care taken to pad all areas and keep the away arm at and above the level of the atrium for DVT prophylaxis.  The shoulder, arm and hand was prepped and draped in the usual sterile fashion.  At the start of the procedure, a local injection was given at the portal sites and at the end of the procedure a shoulder injection given for post-op pain control.    Portal sites were made for the arthroscopic portion of the procedure.  Evaluation of the glenohumeral joint space revealed mild degenerative changes diffusely, type 1 degenerative anterosuperior labral tears, medium size rotator cuff supraspinatus complete tear with mild retraction, long head of the biceps intact, intact subscapularis with minor upper border fraying, intact middle and inferior glenohumeral ligaments, intact posterior labrum, no loose bodies and mild synovitis.  At the subacromial space, there was considerable bursitis, anterolateral downward sloping type 3 acromion morphology and hypertrophic spurs of advanced acromioclavicular joint osteoarthritis.    Treatment consisted of diagnostic shoulder assessment, labral debridement, subacromial decompression bursectomy, acromioplasty without formal acromioclavicular joint release and distal clavicle excision.  Minor bursal bleeding controlled with arthroscopic cautery.  Then a mini-open approach in line with the deltoid fibers permitted good visualization of the rotator cuff tear.  The outlet was assessed again and well decompressed.  The tendon margin was freshened to a viable edge, and the greater tuberosity trough prepared with a rongeur.  Using a suture anchor system, a secure repair of the cuff was achieved.  The main repair was reinforced with #1 Vicryl sutures.  Representative arthroscopic photos were saved throughout the diagnostic assessment and arthroscopic procedure steps.  At the end of the procedure, the shoulder  was irrigated well and suctioned clear.  Routine closure of the portal sites and mini-open incision were performed.  A sterile dressing was applied and a shoulder immobilizer placed for support, protection and comfort.   Anesthesia was effective and well tolerated.  There were no complications of the procedure. The patient was transferred stable to recovery.

## 2018-12-13 NOTE — ANESTHESIA PROCEDURE NOTES
Peripheral Block      Patient reassessed immediately prior to procedure    Patient location during procedure: pre-op  Start time: 12/13/2018 9:55 AM  Stop time: 12/13/2018 10:05 AM  Reason for block: at surgeon's request and post-op pain management  Performed by  CRNA: Michael Bryson CRNA  Preanesthetic Checklist  Completed: patient identified, site marked, surgical consent, pre-op evaluation, timeout performed, IV checked, risks and benefits discussed and monitors and equipment checked  Prep:  Pt Position: sitting  Sterile barriers:cap, gloves, mask and sterile barriers  Prep: ChloraPrep  Patient monitoring: blood pressure monitoring, continuous pulse oximetry and EKG  Procedure  Sedation:yes    Guidance:ultrasound guided  ULTRASOUND INTERPRETATION. Using ultrasound guidance a gauge needle was placed in close proximity to the brachial plexus nerve, at which point, under ultrasound guidance anesthetic was injected in the area of the nerve and spread of the anesthesia was seen on ultrasound in close proximity thereto.  There were no abnormalities seen on ultrasound; a digital image was taken; and the patient tolerated the procedure with no complications. Images:still images obtained  Appropriate muscle twitch current: .3 mA.  Laterality:right  Block Type:interscalene  Injection Technique:catheter  Needle Type:echogenic  Needle Gauge:18 G  Resistance on Injection: none  Catheter Size:20 G  Medications Used: lidocaine PF (XYLOCAINE) injection 2 %, 1 mL  bupivacaine PF (MARCAINE) injection 0.5%, 20 mL  Med admintered at 12/13/2018 9:55 AM  Medications  Comment:Adjuncts per total volume of LA:    Decadron8  mg PSF      If required, intravenous sedation was given -- see meds on anesthesia record.    Post Assessment  Injection Assessment: negative aspiration for heme, no paresthesia on injection and incremental injection  Patient Tolerance:comfortable throughout block  Complications:no  Additional Notes  Procedure:                 CATHETER INTERSCALENE                                                                        Catheter at skin-5                                       Patient analgesia was achieved with Skin infiltration 2ml Lidocaine      The pt was placed in semi-fowlers position with a slight tilt of the thorax contralateral to the insertion site.  The Insertion Site was prepped and draped in sterile fashion.  The skin was anesthetized with Lidocaine 2% 1ml injection utilizing a 25g needle.  Utilizing ultrasound guidance, a I-Flow 18 ga echogenic needle was advanced in-plane.  Major vessels (carotid and Internal Jugular) were visualized as the brachial plexus was approached at the approximate level of C-7/ T-1.  Cervical 5 and Branches of Cervical 6 nerve roots were visualized and the needle tip was placed posterior at the level of C-6 roots.  LA spread was visualized and injection was made incrementally every 5 mls with aspiration. Injection pressure was normal or little; there was no intraneural injection, no vascular injection.      The I-Flow 20  catheter was then placed under ultrasound guidance on the posterior aspect of the Brachial Plexus. Location of catheter was confirmed with NS or air injection visualized with ultrasound . The needle was then removed and the skin was sealed with Skin Affiix at catheter insertion site.  Skin was prepped with benzoin or mastisol and the labeled curled catheter was secured with steristrips and a transparent dressing.

## 2018-12-13 NOTE — ANESTHESIA POSTPROCEDURE EVALUATION
Patient: Markell Chilel    Procedure Summary     Date:  12/13/18 Room / Location:  [facility] OR  /  [facility]    Anesthesia Start:  1010 Anesthesia Stop:  1205    Procedure:  shoulder arthroscopy, right,  subacromial decompression, distal clavicle excision and acromioplasty, mini-open rotator cuff repair (Right Shoulder) Diagnosis:       Shoulder impingement syndrome, right      (Shoulder impingement syndrome, right [M75.41])    Surgeon:  Camilo Miller MD Provider:  Bethel Stein CRNA    Anesthesia Type:  general with block ASA Status:  2          Anesthesia Type: general with block  Last vitals  BP   111/57 @ 1206   Temp   98.2   Pulse 68   Resp 9   SpO2 99%     Post Anesthesia Care and Evaluation    Patient location during evaluation: PACU  Patient participation: waiting for patient participation  Level of consciousness: responsive to physical stimuli  Pain management: adequate  Airway patency: patent  Anesthetic complications: No anesthetic complications  PONV Status: none  Cardiovascular status: acceptable  Respiratory status: acceptable, spontaneous ventilation, oral airway and face mask  Hydration status: acceptable

## 2018-12-13 NOTE — ANESTHESIA PREPROCEDURE EVALUATION
Anesthesia Evaluation     Patient summary reviewed and Nursing notes reviewed   NPO Solid Status: > 8 hours  NPO Liquid Status: > 8 hours           Airway   Mallampati: II  TM distance: >3 FB  Neck ROM: full  No difficulty expected  Dental - normal exam     Pulmonary - normal exam   (+) a smoker Current Smoked day of surgery,   Cardiovascular - negative cardio ROS and normal exam  Exercise tolerance: excellent (>7 METS)        Neuro/Psych  (+) psychiatric history Anxiety,     GI/Hepatic/Renal/Endo    (+)  GERD,      Musculoskeletal     Abdominal  - normal exam    Bowel sounds: normal.   Substance History - negative use     OB/GYN negative ob/gyn ROS         Other   (+) arthritis     ROS/Med Hx Other: + nasal congestion  - fever,cough, ST                  Anesthesia Plan    ASA 2     general with block   (Preoperative bracial plexus with catheter and onQ pump)  intravenous induction

## 2018-12-13 NOTE — INTERVAL H&P NOTE
H&P reviewed. The patient was examined and there are no changes to the H&P, inclusive of the physical exam heart, lungs and procedure site specific exam as noted on the current (within 30 days) history and physical full detailed report.    Vitals:    12/13/18 0752   BP: 145/85   Pulse: 84   Resp: 16   Temp: 98.2 °F (36.8 °C)   SpO2: 95%       Camilo Miller MD  12/13/2018  8:13 AM

## 2018-12-13 NOTE — ANESTHESIA PROCEDURE NOTES
ANESTHESIA INTUBATION  Urgency: elective    Airway not difficult    General Information and Staff    Patient location during procedure: OR  CRNA: Bethel Stein CRNA    Indications and Patient Condition  Indications for airway management: airway protection    Preoxygenated: yes  MILS maintained throughout  Mask difficulty assessment: 1 - vent by mask    Final Airway Details  Final airway type: endotracheal airway      Successful airway: ETT  Cuffed: yes   Successful intubation technique: direct laryngoscopy  Facilitating devices/methods: intubating stylet  Endotracheal tube insertion site: oral  Blade: Surekha  Blade size: 4  ETT size (mm): 7.5  Cormack-Lehane Classification: grade IIb - view of arytenoids or posterior of glottis only  Placement verified by: chest auscultation, capnometry and palpation of cuff   Cuff volume (mL): 6  Measured from: teeth  ETT to teeth (cm): 23  Number of attempts at approach: 1    Additional Comments  Airway placed without problems. Dentition as noted on pre-induction. Lips were noted to be chapped, Right upper lip noted to be bleeding slightly after intubation. Gauze applied to inner lip, bleeding controled.  ETT cuff inflated to minimal occlusive pressure. ETT secured. NOTE: Pt with anterior airway which made visualization of VC difficult.

## 2018-12-14 NOTE — PROGRESS NOTES
No anesthesia related problems.  Has motor function in hand.  TIFF Yuan    Nerve Cath Post Op Call    Patient Name: Markell Chilel  :  1960  MRN:  [id number]  Date of Discharge: 2018    Nerve Cath Post Op Call:    Analgesia:Excellent  Pain Score:0/10  Side Effects:None  Catheter Site:clean  Patient Controlled ON Q pump infusion rate: 6ml/hr

## 2018-12-15 NOTE — PROGRESS NOTES
TIFF Yuan    Nerve Cath Post Op Call    Patient Name: Markell Chilel  :  1960  MRN:  [id number]  Date of Discharge: 2018    Nerve Cath Post Op Call:    Catheter Plan:Patient called/No answer/Message left to call [facility] pain service for any questions or complaints

## 2018-12-16 NOTE — PROGRESS NOTES
TIFF Yuan    Nerve Cath Post Op Call    Patient Name: Markell Chilel  :  1960  MRN:  [id number]  Date of Discharge: 2018    Nerve Cath Post Op Call:    Analgesia:Fair  Pain Score:6/10  Side Effects:None  Catheter Site:clean  Catheter Plan:Patient/Family member report nerve catheter previously discontinued, tip intact    Pt states that OnQ ball ran out at 10:00 AM and was removed. He said all motor and sensory feeling has returned. Hesitates to take pain pills as he had them in the past for back pain. Wanted to know if he could get a single injection of narcotic. I told him that he would have to discuss that with Dr Miller.

## 2018-12-26 ENCOUNTER — OFFICE VISIT (OUTPATIENT)
Dept: ORTHOPEDIC SURGERY | Facility: CLINIC | Age: 58
End: 2018-12-26

## 2018-12-26 VITALS — RESPIRATION RATE: 18 BRPM | HEIGHT: 72 IN | BODY MASS INDEX: 26.68 KG/M2 | WEIGHT: 197 LBS

## 2018-12-26 DIAGNOSIS — Z98.890 S/P ARTHROSCOPY OF RIGHT SHOULDER: Primary | ICD-10-CM

## 2018-12-26 DIAGNOSIS — Z98.890 S/P RIGHT ROTATOR CUFF REPAIR: ICD-10-CM

## 2018-12-26 PROCEDURE — 99024 POSTOP FOLLOW-UP VISIT: CPT | Performed by: PHYSICIAN ASSISTANT

## 2018-12-26 RX ORDER — TIZANIDINE 4 MG/1
4 TABLET ORAL NIGHTLY PRN
Qty: 20 TABLET | Refills: 0 | Status: SHIPPED | OUTPATIENT
Start: 2018-12-26 | End: 2019-02-27 | Stop reason: SDUPTHER

## 2018-12-26 RX ORDER — CELECOXIB 200 MG/1
200 CAPSULE ORAL 2 TIMES DAILY
Qty: 60 CAPSULE | Refills: 0 | Status: SHIPPED | OUTPATIENT
Start: 2018-12-26 | End: 2019-02-27 | Stop reason: ALTCHOICE

## 2018-12-26 NOTE — PROGRESS NOTES
"Subjective   Patient ID: Markell Chilel is a 58 y.o. right hand dominant male is here today for a post-operative visit.  Post-op and Pain of the Right Shoulder (Patient is here today for his first post operative visit and staple removal. He states his shoulder feels like a constant tooth ache.)          CHIEF COMPLAINT:        History of Present Illness      Pain controlled: [] no   [x] yes   Medication refill requested: [x] no   [] yes    Patient compliant with instructions: [] no   [x] yes   Other: Reports good progress since surgery.  Norco controls pain. He is having trouble sleeping at night.   Denies CP or SOA  Denies numbness or tingling to RUE     Past Medical History:   Diagnosis Date   • Anxiety    • Arthritis    • GERD (gastroesophageal reflux disease)    • Hx of exercise stress test 2014    IN [place]-\"IT WAS NORMAL\"   • Rotator cuff syndrome    • Wears contact lenses         Past Surgical History:   Procedure Laterality Date   • COLONOSCOPY     • ENDOSCOPY     • shoulder arthroscopy, right,  subacromial decompression, distal clavicle excision and acromioplasty, mini-open rotator cuff repair Right 12/13/2018    Performed by Cam[facility] Miller [facility] at [facility] OR   • SHOULDER SURGERY Left    • WISDOM TOOTH EXTRACTION         Allergies   Allergen Reactions   • Penicillins Hives       Review of Systems   Constitutional: Negative for fever.   HENT: Negative for voice change.    Eyes: Negative for visual disturbance.   Respiratory: Negative for shortness of breath.    Cardiovascular: Negative for chest pain.   Gastrointestinal: Negative for abdominal distention and abdominal pain.   Genitourinary: Negative for dysuria.   Musculoskeletal: Positive for arthralgias. Negative for gait problem and joint swelling.   Skin: Negative for rash.   Neurological: Negative for speech difficulty.   Hematological: Does not bruise/bleed easily.   Psychiatric/Behavioral: Negative for confusion.       Objective   Resp 18   " "Ht 182.9 cm (72\")   Wt 89.4 kg (197 lb)   BMI 26.72 kg/m²       Signs of infection: [x] no                    [] yes   Drainage: [x] no                    [] yes   Incision: [x] healing well     []healed well   Motor exam intact: [] no                    [x] yes   Neurovascular exam intact: [] no                    [x] yes   Signs of compartment syndrome: [x] no                    [] yes   Signs of DVT: [x] no                    [] yes   Other:      Physical Exam  Ortho Exam      Neurologic Exam      Assessment/Plan    Independent Review of Radiographic Studies:    No new imaging done today.  Laboratory and Other Studies:  No new results reviewed today.   Medical Decision Making:    Stable neurovascular exam.    Procedures     Markell was seen today for post-op and pain.    Diagnoses and all orders for this visit:    S/P arthroscopy of right shoulder  -     Ambulatory Referral to Physical Therapy Evaluate and treat, Ortho    S/P right rotator cuff repair  -     Ambulatory Referral to Physical Therapy Evaluate and treat, Ortho    Other orders  -     celecoxib (CeleBREX) 200 MG capsule; Take 1 capsule by mouth 2 (Two) Times a Day.  -     tiZANidine (ZANAFLEX) 4 MG tablet; Take 1 tablet by mouth At Night As Needed (if needed).          Recommendations/Plan:     Sutures Staples or Pins [x] Removed today  [] At prior visit  [] Plan removal later   Physical therapy: []rehab facility  [x]outpatient referral patient given RTC repair protocol   [] therapy ongoing   Ultrasound: [x]not ordered         []order given to patient   Labs: [x]not ordered         []order given to patient   Weight Bearing status: []Full []WBAT []PWB [x]NWB []Other     Ice, heat, and/or modalities as beneficial  Reduced physical activity as appropriate and avoid offending activity  Weight bearing parameters reviewed  Physical therapy referral given         Exercise, medications, injections, other patient advice, and return appointment as " noted.    Patient is encouraged to call or return for any issues or concerns.    Enroll in PT  Wear shoulder sling until 3rd or 4th week  Fu in 6 weeks    Patient agreeable to call or return sooner for any concerns.

## 2019-01-09 NOTE — PROGRESS NOTES
Using the standard pharmacy refill requested I did renew a 90 day supply of tizanidine 4 mg taking 1 tablet at night.

## 2019-01-30 ENCOUNTER — OFFICE VISIT (OUTPATIENT)
Dept: ORTHOPEDIC SURGERY | Facility: CLINIC | Age: 59
End: 2019-01-30

## 2019-01-30 VITALS — BODY MASS INDEX: 26.68 KG/M2 | HEIGHT: 72 IN | RESPIRATION RATE: 18 BRPM | WEIGHT: 197 LBS

## 2019-01-30 DIAGNOSIS — Z98.890 S/P ARTHROSCOPY OF RIGHT SHOULDER: Primary | ICD-10-CM

## 2019-01-30 DIAGNOSIS — Z98.890 S/P RIGHT ROTATOR CUFF REPAIR: ICD-10-CM

## 2019-01-30 PROCEDURE — 99024 POSTOP FOLLOW-UP VISIT: CPT | Performed by: PHYSICIAN ASSISTANT

## 2019-01-30 NOTE — PROGRESS NOTES
"Subjective   Patient ID: Markell Chilel is a 58 y.o. right hand dominant male  Post-op and Pain of the Right Shoulder (Patient is here today for a follow up on his right shoulder, he states he is still participating in therapy, and that therapy causes some discomfort. His pain level is 3/10 throbbing.)         History of Present Illness  Patient presents as a routine follow-up visit in regards to right shoulder arthroscopy.  Date of surgery 12/13/2018.  Patient is currently in physical therapy and states he is doing well.  He still has pain from time to time.  He currently denies numbness or tingling to the upper extremity.                                                     Past Medical History:   Diagnosis Date   • Anxiety    • Arthritis    • GERD (gastroesophageal reflux disease)    • Hx of exercise stress test 2014    IN [place]-\"IT WAS NORMAL\"   • Rotator cuff syndrome    • Wears contact lenses         Past Surgical History:   Procedure Laterality Date   • COLONOSCOPY     • ENDOSCOPY     • SHOULDER ARTHROSCOPY Right 12/13/2018    Procedure: shoulder arthroscopy, right,  subacromial decompression, distal clavicle excision and acromioplasty, mini-open rotator cuff repair;  Surgeon: Camilo Miller MD;  Location: [facility];  Service: Orthopedics   • SHOULDER SURGERY Left    • WISDOM TOOTH EXTRACTION         History reviewed. No pertinent family history.    Social History     Socioeconomic History   • Marital status: Single     Spouse name: Not on file   • Number of children: Not on file   • Years of education: Not on file   • Highest education level: Not on file   Social Needs   • Financial resource strain: Not on file   • Food insecurity - worry: Not on file   • Food insecurity - inability: Not on file   • Transportation needs - medical: Not on file   • Transportation needs - non-medical: Not on file   Occupational History   • Not on file   Tobacco Use   • Smoking status: Current Every Day Smoker     " "Years: 30.00     Types: Cigars   • Smokeless tobacco: Never Used   • Tobacco comment: 3-4 PER DAY   Substance and Sexual Activity   • Alcohol use: Yes     Alcohol/week: 7.2 oz     Types: 12 Cans of beer per week   • Drug use: No   • Sexual activity: Defer   Other Topics Concern   • Not on file   Social History Narrative   • Not on file         Current Outpatient Medications:   •  celecoxib (CeleBREX) 200 MG capsule, Take 1 capsule by mouth 2 (Two) Times a Day., Disp: 60 capsule, Rfl: 0  •  HYDROcodone-acetaminophen (NORCO) 7.5-325 MG per tablet, Take 1 tablet by mouth Every 6 (Six) Hours As Needed for Moderate Pain ., Disp: , Rfl:   •  omeprazole (priLOSEC) 20 MG capsule, Take 20 mg by mouth Daily., Disp: , Rfl:   •  tiZANidine (ZANAFLEX) 4 MG tablet, Take 1 tablet by mouth At Night As Needed (if needed)., Disp: 20 tablet, Rfl: 0    Allergies   Allergen Reactions   • Penicillins Hives       Review of Systems   Constitutional: Negative for fever.   HENT: Negative for voice change.    Eyes: Negative for visual disturbance.   Respiratory: Negative for shortness of breath.    Cardiovascular: Negative for chest pain.   Gastrointestinal: Negative for abdominal distention and abdominal pain.   Genitourinary: Negative for dysuria.   Musculoskeletal: Positive for arthralgias. Negative for gait problem and joint swelling.   Skin: Negative for rash.   Neurological: Negative for speech difficulty.   Hematological: Does not bruise/bleed easily.   Psychiatric/Behavioral: Negative for confusion.       Objective   Resp 18   Ht 182.9 cm (72\")   Wt 89.4 kg (197 lb)   BMI 26.72 kg/m²    Physical Exam   Constitutional: He is oriented to person, place, and time. He appears well-nourished.   Pulmonary/Chest: Effort normal.   Musculoskeletal:        Right shoulder: He exhibits tenderness and pain. He exhibits no swelling, no effusion and no deformity.        Right hand: He exhibits normal range of motion and normal capillary refill. " Normal sensation noted.   Neurological: He is alert and oriented to person, place, and time.   Skin: Capillary refill takes less than 2 seconds.   Psychiatric: He has a normal mood and affect.   Vitals reviewed.    Right Shoulder Exam     Range of Motion   Active abduction: 120   Forward flexion: 120   Internal rotation 0 degrees: Sacrum     Muscle Strength   The patient has normal right shoulder strength.    Other   Erythema: absent  Scars: present  Sensation: normal           Extremity DVT signs are Negative by clinical screen.   Neurologic Exam     Mental Status   Oriented to person, place, and time.        Neg dino's tests to RUE      Assessment/Plan   Independent Review of Radiographic Studies:    No new imaging done today.      Procedures       Markell was seen today for post-op and pain.    Diagnoses and all orders for this visit:    S/P arthroscopy of right shoulder    S/P right rotator cuff repair       Orthopedic activities reviewed and patient expressed appreciation  Discussion of orthopedic goals  Risk, benefits, and merits of treatment alternatives reviewed with the patient and questions answered    Recommendations/Plan:  Exercise, medications, injections, other patient advice, and return appointment as noted.  Patient is encouraged to call or return for any issues or concerns.  Continue physical therapy continue using ice and warm heat.  Also recommended using Celebrex.   A work status form was completed and reviewed with the patient.  FU in 4 weeks  Patient agreeable to call or return sooner for any concerns.

## 2019-02-27 ENCOUNTER — OFFICE VISIT (OUTPATIENT)
Dept: ORTHOPEDIC SURGERY | Facility: CLINIC | Age: 59
End: 2019-02-27

## 2019-02-27 VITALS — RESPIRATION RATE: 18 BRPM | WEIGHT: 202 LBS | HEIGHT: 72 IN | BODY MASS INDEX: 27.36 KG/M2

## 2019-02-27 DIAGNOSIS — Z98.890 S/P ARTHROSCOPY OF RIGHT SHOULDER: Primary | ICD-10-CM

## 2019-02-27 DIAGNOSIS — M62.838 TRAPEZIUS MUSCLE SPASM: ICD-10-CM

## 2019-02-27 PROCEDURE — 99024 POSTOP FOLLOW-UP VISIT: CPT | Performed by: PHYSICIAN ASSISTANT

## 2019-02-27 RX ORDER — PREDNISONE 20 MG/1
20 TABLET ORAL 2 TIMES DAILY
Qty: 10 TABLET | Refills: 0 | OUTPATIENT
Start: 2019-02-27 | End: 2019-05-23

## 2019-02-27 RX ORDER — TIZANIDINE 4 MG/1
4 TABLET ORAL NIGHTLY PRN
Qty: 20 TABLET | Refills: 0 | OUTPATIENT
Start: 2019-02-27 | End: 2019-10-12

## 2019-02-27 NOTE — PROGRESS NOTES
"Subjective   Patient ID: Markell Chilel is a 58 y.o. right hand dominant male is here today for a post-operative visit.  Post-op and Pain of the Right Shoulder (Patient is here today for a post operative visit, he states he is having pain in the shoulder that's going to his neck. His pain level is 5/10.)          CHIEF COMPLAINT:    History of Present Illness      Pain controlled: [] no   [x] yes   Medication refill requested: [x] no   [] yes    Patient compliant with instructions: [] no   [x] yes   Other: Reports good progress since surgery.  Patient states his right shoulder is feeling much better.  However, while doing physical therapy he has noticed tightness and pain along the right side of his neck radiating into the right shoulder.  He has tried oral analgesics with no improvement.  He did take a very warm shower which seemed to help temporarily.  He denies numbness or tingling to the upper extremity.     Past Medical History:   Diagnosis Date   • Anxiety    • Arthritis    • GERD (gastroesophageal reflux disease)    • Hx of exercise stress test 2014    IN [place]-\"IT WAS NORMAL\"   • Rotator cuff syndrome    • Wears contact lenses         Past Surgical History:   Procedure Laterality Date   • COLONOSCOPY     • ENDOSCOPY     • SHOULDER ARTHROSCOPY Right 12/13/2018    Procedure: shoulder arthroscopy, right,  subacromial decompression, distal clavicle excision and acromioplasty, mini-open rotator cuff repair;  Surgeon: Camilo Miller MD;  Location: [facility];  Service: Orthopedics   • SHOULDER SURGERY Left    • WISDOM TOOTH EXTRACTION         Allergies   Allergen Reactions   • Penicillins Hives       Review of Systems   Constitutional: Negative for fever.   HENT: Negative for voice change.    Eyes: Negative for visual disturbance.   Respiratory: Negative for shortness of breath.    Cardiovascular: Negative for chest pain.   Gastrointestinal: Negative for abdominal distention and abdominal pain. " "  Genitourinary: Negative for dysuria.   Musculoskeletal: Positive for arthralgias. Negative for gait problem and joint swelling.   Skin: Negative for rash.   Neurological: Negative for speech difficulty.   Hematological: Does not bruise/bleed easily.   Psychiatric/Behavioral: Negative for confusion.       Objective   Resp 18   Ht 182.9 cm (72\")   Wt 91.6 kg (202 lb)   BMI 27.40 kg/m²       Signs of infection: [x] no                    [] yes   Drainage: [x] no                    [] yes   Incision: [x] healing well     []healed well   Motor exam intact: [] no                    [x] yes   Neurovascular exam intact: [] no                    [x] yes   Signs of compartment syndrome: [x] no                    [] yes   Signs of DVT: [x] no                    [] yes   Other:      Physical Exam   Constitutional: He is oriented to person, place, and time. He appears well-developed.   Eyes: Conjunctivae are normal.   Pulmonary/Chest: No respiratory distress.   Musculoskeletal:        Right shoulder: He exhibits tenderness (right trapezius muscle). He exhibits no crepitus and no deformity.        Cervical back: He exhibits tenderness. He exhibits no bony tenderness, no swelling, no edema and no deformity.        Right hand: He exhibits normal capillary refill and no swelling. Normal strength noted.   Neurological: He is alert and oriented to person, place, and time.   Psychiatric: He has a normal mood and affect.   Vitals reviewed.    Right Shoulder Exam     Range of Motion   Active abduction: 120   Right shoulder forward flexion: 125.     Muscle Strength   The patient has normal right shoulder strength.            Extremity DVT signs are Negative by clinical screen.  Neurologic Exam     Mental Status   Oriented to person, place, and time.         Assessment/Plan   Independent Review of Radiographic Studies:    No new imaging done today.  Laboratory and Other Studies:  No new results reviewed today.   Medical Decision " Making:    Stable neurovascular exam.       Procedures     Markell was seen today for post-op and pain.    Diagnoses and all orders for this visit:    S/P arthroscopy of right shoulder  -     XR Spine Cervical 2 or 3 View  -     tiZANidine (ZANAFLEX) 4 MG tablet; Take 1 tablet by mouth At Night As Needed (if needed).  -     predniSONE (DELTASONE) 20 MG tablet; Take 1 tablet by mouth 2 (Two) Times a Day.    Trapezius muscle spasm    Other orders  -     diclofenac (VOLTAREN) 1 % gel gel; Apply 4 g topically to the appropriate area as directed 3 (Three) Times a Day.         Recommendations/Plan:     Sutures Staples or Pins [] Removed today  [] At prior visit  [] Plan removal later   Physical therapy: []rehab facility  []outpatient referral  [] therapy ongoing   Ultrasound: []not ordered         []order given to patient   Labs: []not ordered         []order given to patient   Weight Bearing status: []Full [x]WBAT []PWB []NWB []Other            Exercise, medications, injections, other patient advice, and return appointment as noted.    Patient is encouraged to call or return for any issues or concerns.  Continue using warm moist heat.  FU once you have completed PT  Patient agreeable to call or return sooner for any concerns.

## 2019-04-04 ENCOUNTER — OFFICE VISIT (OUTPATIENT)
Dept: ORTHOPEDIC SURGERY | Facility: CLINIC | Age: 59
End: 2019-04-04

## 2019-04-04 VITALS — WEIGHT: 198 LBS | BODY MASS INDEX: 26.82 KG/M2 | RESPIRATION RATE: 18 BRPM | HEIGHT: 72 IN

## 2019-04-04 DIAGNOSIS — Z98.890 S/P ARTHROSCOPY OF RIGHT SHOULDER: Primary | ICD-10-CM

## 2019-04-04 PROCEDURE — 99213 OFFICE O/P EST LOW 20 MIN: CPT | Performed by: PHYSICIAN ASSISTANT

## 2019-04-04 NOTE — PROGRESS NOTES
"Subjective   Patient ID: Markell Chilel is a 58 y.o. right hand dominant male  Follow-up of the Right Shoulder (shoulder arthroscopy, right,  subacromial decompression, distal clavicle excision and acromioplasty, mini-open rotator cuff repair 12/13/18/Patient would like to discuss RTW)         History of Present Illness  Patient is following up with a scheduled appointment in regards to right shoulder arthroscopy.  He states he is doing much better.  He states he does participate in regular home exercises, yard work and lifting weights with no problem.  He is ready to return to work with no restrictions.  Pain Score: 4  Pain Location: Shoulder  Pain Orientation: Right     Pain Descriptors: Aching  Pain Frequency: Intermittent        Clinical Progression: Gradually improving  Aggravating Factors: Stretching                   Past Medical History:   Diagnosis Date   • Anxiety    • Arthritis    • GERD (gastroesophageal reflux disease)    • Hx of exercise stress test 2014    IN [place]-\"IT WAS NORMAL\"   • Rotator cuff syndrome    • Wears contact lenses         Past Surgical History:   Procedure Laterality Date   • COLONOSCOPY     • ENDOSCOPY     • SHOULDER ARTHROSCOPY Right 12/13/2018    Procedure: shoulder arthroscopy, right,  subacromial decompression, distal clavicle excision and acromioplasty, mini-open rotator cuff repair;  Surgeon: Camilo Miller MD;  Location: [facility];  Service: Orthopedics   • SHOULDER SURGERY Left    • WISDOM TOOTH EXTRACTION         History reviewed. No pertinent family history.    Social History     Socioeconomic History   • Marital status: Single     Spouse name: Not on file   • Number of children: Not on file   • Years of education: Not on file   • Highest education level: Not on file   Tobacco Use   • Smoking status: Current Every Day Smoker     Years: 30.00     Types: Cigars   • Smokeless tobacco: Never Used   • Tobacco comment: 3-4 PER DAY   Substance and Sexual Activity " "  • Alcohol use: Yes     Alcohol/week: 7.2 oz     Types: 12 Cans of beer per week   • Drug use: No   • Sexual activity: Defer         Current Outpatient Medications:   •  diclofenac (VOLTAREN) 1 % gel gel, Apply 4 g topically to the appropriate area as directed 3 (Three) Times a Day., Disp: 100 g, Rfl: 1  •  HYDROcodone-acetaminophen (NORCO) 7.5-325 MG per tablet, Take 1 tablet by mouth Every 6 (Six) Hours As Needed for Moderate Pain ., Disp: , Rfl:   •  omeprazole (priLOSEC) 20 MG capsule, Take 20 mg by mouth Daily., Disp: , Rfl:   •  predniSONE (DELTASONE) 20 MG tablet, Take 1 tablet by mouth 2 (Two) Times a Day., Disp: 10 tablet, Rfl: 0  •  tiZANidine (ZANAFLEX) 4 MG tablet, Take 1 tablet by mouth At Night As Needed (if needed)., Disp: 20 tablet, Rfl: 0    Allergies   Allergen Reactions   • Penicillins Hives       Review of Systems   Constitutional: Negative for diaphoresis, fever and unexpected weight change.   HENT: Negative for dental problem and sore throat.    Eyes: Negative for visual disturbance.   Respiratory: Negative for shortness of breath.    Cardiovascular: Negative for chest pain.   Gastrointestinal: Negative for abdominal pain, constipation, diarrhea, nausea and vomiting.   Genitourinary: Negative for difficulty urinating and frequency.   Musculoskeletal: Positive for arthralgias (occasional right shoulder).   Neurological: Negative for headaches.   Hematological: Does not bruise/bleed easily.       Objective   Resp 18   Ht 182.9 cm (72\")   Wt 89.8 kg (198 lb)   BMI 26.85 kg/m²    Physical Exam   Constitutional: He is oriented to person, place, and time. He appears well-nourished.   Pulmonary/Chest: Effort normal.   Musculoskeletal:        Right shoulder: He exhibits normal range of motion, no tenderness, no bony tenderness, no swelling, no effusion, no crepitus, no deformity, no pain, no spasm, normal pulse and normal strength.        Right hand: He exhibits normal capillary refill and no " swelling.   Neurological: He is alert and oriented to person, place, and time.   Skin: Capillary refill takes less than 2 seconds.   Psychiatric: He has a normal mood and affect. His behavior is normal.   Vitals reviewed.    Right Shoulder Exam     Range of Motion   Active abduction: 140   Forward flexion: 140   Internal rotation 0 degrees: Sacrum   Internal rotation 90 degrees: 60     Muscle Strength   The patient has normal right shoulder strength.    Tests   Cross arm: negative  Impingement: negative  Drop arm: negative  Sulcus: absent    Other   Sensation: normal  Pulse: present           Extremity DVT signs are Negative by clinical screen.   Neurologic Exam     Mental Status   Oriented to person, place, and time.              Assessment/Plan   Independent Review of Radiographic Studies:    No new imaging done today.      Procedures       Markell was seen today for follow-up.    Diagnoses and all orders for this visit:    S/P arthroscopy of right shoulder       Orthopedic activities reviewed and patient expressed appreciation  Discussion of orthopedic goals  Risk, benefits, and merits of treatment alternatives reviewed with the patient and questions answered  Work status form completed and provided to patient    Recommendations/Plan:  Exercise, medications, injections, other patient advice, and return appointment as noted.  Patient is encouraged to call or return for any issues or concerns.    FU PRN    Patient agreeable to call or return sooner for any concerns.

## 2019-05-08 ENCOUNTER — OFFICE VISIT (OUTPATIENT)
Dept: ORTHOPEDIC SURGERY | Facility: CLINIC | Age: 59
End: 2019-05-08

## 2019-05-08 VITALS — RESPIRATION RATE: 18 BRPM | HEIGHT: 72 IN | BODY MASS INDEX: 26.82 KG/M2 | WEIGHT: 198 LBS

## 2019-05-08 DIAGNOSIS — Z98.890 S/P ARTHROSCOPY OF RIGHT SHOULDER: Primary | ICD-10-CM

## 2019-05-08 DIAGNOSIS — M25.511 RIGHT SHOULDER PAIN, UNSPECIFIED CHRONICITY: ICD-10-CM

## 2019-05-08 DIAGNOSIS — M75.21 BICEPS TENDINITIS OF RIGHT SHOULDER: ICD-10-CM

## 2019-05-08 PROCEDURE — 99213 OFFICE O/P EST LOW 20 MIN: CPT | Performed by: PHYSICIAN ASSISTANT

## 2019-05-08 PROCEDURE — 96372 THER/PROPH/DIAG INJ SC/IM: CPT | Performed by: PHYSICIAN ASSISTANT

## 2019-05-08 RX ORDER — DEXAMETHASONE SODIUM PHOSPHATE 4 MG/ML
10 INJECTION, SOLUTION INTRA-ARTICULAR; INTRALESIONAL; INTRAMUSCULAR; INTRAVENOUS; SOFT TISSUE TAKE AS DIRECTED
Qty: 30 ML | Refills: 0 | OUTPATIENT
Start: 2019-05-08 | End: 2019-10-12

## 2019-05-08 RX ORDER — METHYLPREDNISOLONE ACETATE 40 MG/ML
80 INJECTION, SUSPENSION INTRA-ARTICULAR; INTRALESIONAL; INTRAMUSCULAR; SOFT TISSUE ONCE
Status: COMPLETED | OUTPATIENT
Start: 2019-05-08 | End: 2019-05-08

## 2019-05-08 RX ADMIN — METHYLPREDNISOLONE ACETATE 80 MG: 40 INJECTION, SUSPENSION INTRA-ARTICULAR; INTRALESIONAL; INTRAMUSCULAR; SOFT TISSUE at 15:12

## 2019-05-08 NOTE — PROGRESS NOTES
"Subjective   Patient ID: Markell Chilel is a 58 y.o. right hand dominant male  Pain of the Right Shoulder (Patient c/o right shoulder pain over the past 2 1/2 weeks. He went back to work about 3 weeks ago. He states the pain is worse with anything he does overhead. )         History of Present Illness    Patient presents with complaints of right anterior shoulder pain that began 2 weeks ago.  He states he returned to work 3 weeks prior and several days after returning to work he noticed pain to the shoulder worse with movement.  He denies numbness or tingling to the upper extremity.  Patient did have right shoulder arthroscopy with rotator cuff repair December 2018.  He states he was doing great up until 2 weeks prior.      Past Medical History:   Diagnosis Date   • Anxiety    • Arthritis    • GERD (gastroesophageal reflux disease)    • Hx of exercise stress test 2014    IN [place]-\"IT WAS NORMAL\"   • Rotator cuff syndrome    • Wears contact lenses         Past Surgical History:   Procedure Laterality Date   • COLONOSCOPY     • ENDOSCOPY     • SHOULDER ARTHROSCOPY Right 12/13/2018    Procedure: shoulder arthroscopy, right,  subacromial decompression, distal clavicle excision and acromioplasty, mini-open rotator cuff repair;  Surgeon: Camilo Miller MD;  Location: [facility];  Service: Orthopedics   • SHOULDER SURGERY Left    • WISDOM TOOTH EXTRACTION         No family history on file.    Social History     Socioeconomic History   • Marital status: Single     Spouse name: Not on file   • Number of children: Not on file   • Years of education: Not on file   • Highest education level: Not on file   Tobacco Use   • Smoking status: Current Every Day Smoker     Years: 30.00     Types: Cigars   • Smokeless tobacco: Never Used   • Tobacco comment: 3-4 PER DAY   Substance and Sexual Activity   • Alcohol use: Yes     Alcohol/week: 7.2 oz     Types: 12 Cans of beer per week   • Drug use: No   • Sexual activity: " "Defer         Current Outpatient Medications:   •  omeprazole (priLOSEC) 20 MG capsule, Take 20 mg by mouth Daily., Disp: , Rfl:   •  tiZANidine (ZANAFLEX) 4 MG tablet, Take 1 tablet by mouth At Night As Needed (if needed)., Disp: 20 tablet, Rfl: 0  •  dexamethasone (DECADRON) 4 MG/ML injection, Apply 2.5 mL topically to the appropriate area as directed Take As Directed. USE AS DIRECTED WITH PHYSICAL THERAPY (IONTOPHORESIS), Disp: 30 mL, Rfl: 0  •  diclofenac (VOLTAREN) 1 % gel gel, Apply 4 g topically to the appropriate area as directed 3 (Three) Times a Day., Disp: 100 g, Rfl: 1  •  HYDROcodone-acetaminophen (NORCO) 7.5-325 MG per tablet, Take 1 tablet by mouth Every 6 (Six) Hours As Needed for Moderate Pain ., Disp: , Rfl:   •  predniSONE (DELTASONE) 20 MG tablet, Take 1 tablet by mouth 2 (Two) Times a Day., Disp: 10 tablet, Rfl: 0  No current facility-administered medications for this visit.     Allergies   Allergen Reactions   • Penicillins Hives       Review of Systems   Constitutional: Negative for fever.   HENT: Negative for voice change.    Eyes: Negative for visual disturbance.   Respiratory: Negative for shortness of breath.    Cardiovascular: Negative for chest pain.   Gastrointestinal: Negative for abdominal distention and abdominal pain.   Genitourinary: Negative for dysuria.   Musculoskeletal: Positive for arthralgias. Negative for gait problem and joint swelling.   Skin: Negative for rash.   Neurological: Negative for speech difficulty.   Hematological: Does not bruise/bleed easily.   Psychiatric/Behavioral: Negative for confusion.       Objective   Resp 18   Ht 182.9 cm (72\")   Wt 89.8 kg (198 lb)   BMI 26.85 kg/m²    Physical Exam   Constitutional: He appears well-developed.   Pulmonary/Chest: Effort normal.   Musculoskeletal:        Right shoulder: He exhibits tenderness (proximal biceps tendon ). He exhibits no swelling, no effusion, no crepitus, no deformity, no spasm, normal pulse and normal " strength.   Neurological: He is alert.   Psychiatric: He has a normal mood and affect.   Vitals reviewed.    Right Shoulder Exam     Tenderness   The patient is experiencing tenderness in the biceps tendon.    Range of Motion   Active abduction: 110   Forward flexion: 120   Internal rotation 0 degrees: Sacrum     Muscle Strength   The patient has normal right shoulder strength.    Tests   Drop arm: negative  Sulcus: absent    Other   Sensation: normal  Pulse: present           Extremity DVT signs are Negative by clinical screen.   Neurologic Exam     + yeargason test RUE      Assessment/Plan   Independent Review of Radiographic Studies:    No new imaging done today.      Procedures       Markell was seen today for pain.    Diagnoses and all orders for this visit:    S/P arthroscopy of right shoulder  -     Ambulatory Referral to Physical Therapy Evaluate and treat, Ortho; Heat; Ultrasound  -     methylPREDNISolone acetate (DEPO-medrol) injection 80 mg    Right shoulder pain, unspecified chronicity  -     methylPREDNISolone acetate (DEPO-medrol) injection 80 mg    Biceps tendinitis of right shoulder  -     Ambulatory Referral to Physical Therapy Evaluate and treat, Ortho; Heat; Ultrasound  -     methylPREDNISolone acetate (DEPO-medrol) injection 80 mg    Other orders  -     dexamethasone (DECADRON) 4 MG/ML injection; Apply 2.5 mL topically to the appropriate area as directed Take As Directed. USE AS DIRECTED WITH PHYSICAL THERAPY (IONTOPHORESIS)       Discussion of orthopedic goals  Risk, benefits, and merits of treatment alternatives reviewed with the patient and questions answered  Call or notify for any adverse effect from injection therapy    Recommendations/Plan:  Patient is encouraged to call or return for any issues or concerns.  FU in 2 weeks -if patient has no improvement will order MRI of the right shoulder    Enroll in PT with US iontophoresis, oral anti-inflammatories    Patient agreeable to call or return  sooner for any concerns.

## 2019-05-09 ENCOUNTER — TELEPHONE (OUTPATIENT)
Dept: ORTHOPEDIC SURGERY | Facility: CLINIC | Age: 59
End: 2019-05-09

## 2019-05-09 NOTE — TELEPHONE ENCOUNTER
Received PA request from [facility] re: dexamethasone rx for iontophoresis. LM with sheila Hudson's WC  to see if she has to PA or if we can do the PA through [facility].

## 2019-06-28 DIAGNOSIS — M25.511 ARTHRALGIA OF RIGHT SHOULDER REGION: Primary | ICD-10-CM

## 2019-07-01 ENCOUNTER — OFFICE VISIT (OUTPATIENT)
Dept: ORTHOPEDIC SURGERY | Facility: CLINIC | Age: 59
End: 2019-07-01

## 2019-07-01 VITALS — WEIGHT: 192 LBS | RESPIRATION RATE: 18 BRPM | HEIGHT: 72 IN | BODY MASS INDEX: 26.01 KG/M2

## 2019-07-01 DIAGNOSIS — M25.511 CHRONIC RIGHT SHOULDER PAIN: ICD-10-CM

## 2019-07-01 DIAGNOSIS — G89.29 CHRONIC RIGHT SHOULDER PAIN: ICD-10-CM

## 2019-07-01 DIAGNOSIS — Z98.890 S/P ARTHROSCOPY OF RIGHT SHOULDER: Primary | ICD-10-CM

## 2019-07-01 DIAGNOSIS — M25.511 ARTHRALGIA OF RIGHT SHOULDER REGION: ICD-10-CM

## 2019-07-01 PROCEDURE — 99213 OFFICE O/P EST LOW 20 MIN: CPT | Performed by: PHYSICIAN ASSISTANT

## 2019-07-01 NOTE — PROGRESS NOTES
"Subjective   Patient ID: Markell Chilel is a 58 y.o. right hand dominant male  Follow-up and Pain of the Right Shoulder (No reinjury, just increased pain with activities, he states it has never gotten better. shoulder arthroscopy, right,  subacromial decompression, distal clavicle excision and acromioplasty, mini-open rotator cuff repair 12/13/18)         History of Present Illness  Patient is following up in regards to continued right shoulder pain.  He states he has had intermittent right shoulder pain since his right shoulder arthroscopy December 2018. However, this past weekend while cutting down a tree using a saw he felt intense right shoulder pain.  He has had decreased range of motion since this past Saturday.  He denies numbness or tingling.                                                   Past Medical History:   Diagnosis Date   • Anxiety    • Arthritis    • GERD (gastroesophageal reflux disease)    • Hx of exercise stress test 2014    IN [place]-\"IT WAS NORMAL\"   • Rotator cuff syndrome    • Wears contact lenses         Past Surgical History:   Procedure Laterality Date   • COLONOSCOPY     • ENDOSCOPY     • SHOULDER ARTHROSCOPY Right 12/13/2018    Procedure: shoulder arthroscopy, right,  subacromial decompression, distal clavicle excision and acromioplasty, mini-open rotator cuff repair;  Surgeon: Camilo Miller MD;  Location: [facility];  Service: Orthopedics   • SHOULDER SURGERY Left    • WISDOM TOOTH EXTRACTION         History reviewed. No pertinent family history.    Social History     Socioeconomic History   • Marital status: Single     Spouse name: Not on file   • Number of children: Not on file   • Years of education: Not on file   • Highest education level: Not on file   Occupational History   • Occupation: [employer]     Employer: [employer]     Comment: started May 2019 w/ new co   Tobacco Use   • Smoking status: Current Every Day Smoker     Years: 30.00     Types: " Cigars   • Smokeless tobacco: Never Used   • Tobacco comment: 3-4 PER DAY   Substance and Sexual Activity   • Alcohol use: Yes     Alcohol/week: 7.2 oz     Types: 12 Cans of beer per week   • Drug use: No   • Sexual activity: Defer       I have reviewed all of the above social hx, family hx, surgical hx, medications, allergies & ROS and confirm that it is accurate.      Current Outpatient Medications:   •  albuterol sulfate  (90 Base) MCG/ACT inhaler, Inhale 2 puffs Every 4 (Four) Hours As Needed for Wheezing., Disp: 1 inhaler, Rfl: 1  •  brompheniramine-pseudoephedrine-DM (BROMFED DM) 30-2-10 MG/5ML syrup, Take 5 mL by mouth 4 (Four) Times a Day As Needed for Cough., Disp: 119 mL, Rfl: 0  •  dexamethasone (DECADRON) 4 MG/ML injection, Apply 2.5 mL topically to the appropriate area as directed Take As Directed. USE AS DIRECTED WITH PHYSICAL THERAPY (IONTOPHORESIS), Disp: 30 mL, Rfl: 0  •  diclofenac (VOLTAREN) 1 % gel gel, Apply 4 g topically to the appropriate area as directed 3 (Three) Times a Day., Disp: 100 g, Rfl: 1  •  fluticasone (FLONASE) 50 MCG/ACT nasal spray, 1-2 sprays each nostril daily, Disp: 1 bottle, Rfl: 1  •  HYDROcodone-acetaminophen (NORCO) 7.5-325 MG per tablet, Take 1 tablet by mouth Every 6 (Six) Hours As Needed for Moderate Pain ., Disp: , Rfl:   •  Lactobacillus (FLORANEX) pack oral packet, Take 1 packet by mouth 3 (Three) Times a Day., Disp: 45 packet, Rfl: 1  •  loperamide (IMODIUM) 2 MG capsule, Take 1 capsule by mouth 4 (Four) Times a Day As Needed for Diarrhea., Disp: 30 capsule, Rfl: 1  •  omeprazole (priLOSEC) 20 MG capsule, Take 20 mg by mouth Daily., Disp: , Rfl:   •  tiZANidine (ZANAFLEX) 4 MG tablet, Take 1 tablet by mouth At Night As Needed (if needed)., Disp: 20 tablet, Rfl: 0    Allergies   Allergen Reactions   • Penicillins Hives       Review of Systems   Constitutional: Negative for diaphoresis, fever and unexpected weight change.   HENT: Negative for dental problem  "and sore throat.    Eyes: Negative for visual disturbance.   Respiratory: Negative for shortness of breath.    Cardiovascular: Negative for chest pain.   Gastrointestinal: Negative for abdominal pain, constipation, diarrhea, nausea and vomiting.   Genitourinary: Negative for difficulty urinating and frequency.   Musculoskeletal: Positive for arthralgias (right shoulder).   Neurological: Negative for headaches.   Hematological: Does not bruise/bleed easily.       Objective   Resp 18   Ht 182.9 cm (72\")   Wt 87.1 kg (192 lb)   BMI 26.04 kg/m²    Physical Exam   Constitutional: He is oriented to person, place, and time. He appears well-developed and well-nourished.   Neck: No tracheal deviation present.   Pulmonary/Chest: Effort normal.   Musculoskeletal:        Right shoulder: He exhibits decreased range of motion (painful Abduction) and tenderness. He exhibits no deformity.        Right elbow: He exhibits no swelling.        Right hand: He exhibits normal capillary refill. Normal sensation noted. Normal strength noted. He exhibits no thumb/finger opposition.   Neurological: He is alert and oriented to person, place, and time.   Psychiatric: He has a normal mood and affect.   Vitals reviewed.    Right Shoulder Exam     Range of Motion   Active abduction: 110   Right shoulder passive abduction: 120.   Right shoulder forward flexion: 115.     Muscle Strength   The patient has normal right shoulder strength.    Tests   Cross arm: positive  Impingement: positive    Other   Sensation: normal  Pulse: present             Neurologic Exam     Mental Status   Oriented to person, place, and time.              Assessment/Plan   Independent Review of Radiographic Studies:    No new imaging done today.      Procedures       Markell was seen today for follow-up and pain.    Diagnoses and all orders for this visit:    S/P arthroscopy of right shoulder  -     MRI shoulder right wo contrast    Arthralgia of right shoulder region  -   "   MRI shoulder right wo contrast    Chronic right shoulder pain       Discussion of orthopedic goals  Risk, benefits, and merits of treatment alternatives reviewed with the patient and questions answered  Work status form completed and provided to patient  Reduced physical activity as appropriate  Weight bearing parameters reviewed  Avoid offending activity  Ice, heat, and/or modalities as beneficial    Recommendations/Plan:  Exercise, medications, injections, other patient advice, and return appointment as noted.  Patient is encouraged to call or return for any issues or concerns.  I would like to obtain a new updated MRI because he is continued to have pain and recently his pain increased after utilizing the right upper extremity.    FU after MRI results    Patient agreeable to call or return sooner for any concerns.           EMR Dragon-transcription disclaimer:  This encounter note is an electronic transcription of spoken language to printed text.  Electronic transcription of spoken language may permit erroneous or at times nonsensical words or phrases to be inadvertently transcribed.  Although I have reviewed the note for such errors, some may still exist

## 2019-07-10 ENCOUNTER — TELEPHONE (OUTPATIENT)
Dept: ORTHOPEDIC SURGERY | Facility: CLINIC | Age: 59
End: 2019-07-10

## 2019-07-10 NOTE — TELEPHONE ENCOUNTER
An  from [facility] [employer] the patients W/C for his right shoulder. The  was very short and declined being transferred to clinical  to leave a message for clinical staff due to them being in clinic. She stated that to just inform the provider that it is denied and that we would figure out that they have denied it. She did not inform what was denied and hung up.

## 2019-07-11 NOTE — TELEPHONE ENCOUNTER
Spoke with lit Avelar. No further treatment will be covered by work comp. Per Stephane note the patient was using a tree saw and re injured his shoulder.

## 2021-04-04 ENCOUNTER — HOSPITAL ENCOUNTER (EMERGENCY)
Facility: HOSPITAL | Age: 61
Discharge: HOME | End: 2021-04-04
Attending: EMERGENCY MEDICINE | Admitting: EMERGENCY MEDICINE

## 2021-04-04 ENCOUNTER — APPOINTMENT (OUTPATIENT)
Dept: GENERAL RADIOLOGY | Facility: HOSPITAL | Age: 61
End: 2021-04-04

## 2021-04-04 VITALS
OXYGEN SATURATION: 98 % | RESPIRATION RATE: 16 BRPM | SYSTOLIC BLOOD PRESSURE: 144 MMHG | TEMPERATURE: 97.7 F | BODY MASS INDEX: 26.14 KG/M2 | HEART RATE: 82 BPM | WEIGHT: 193 LBS | DIASTOLIC BLOOD PRESSURE: 82 MMHG | HEIGHT: 72 IN

## 2021-04-04 DIAGNOSIS — R05.9 COUGH: Primary | ICD-10-CM

## 2021-04-04 PROCEDURE — 71046 X-RAY EXAM CHEST 2 VIEWS: CPT

## 2021-04-04 PROCEDURE — 94640 AIRWAY INHALATION TREATMENT: CPT

## 2021-04-04 PROCEDURE — 63710000001 PREDNISONE PER 5 MG: Performed by: EMERGENCY MEDICINE

## 2021-04-04 PROCEDURE — 99283 EMERGENCY DEPT VISIT LOW MDM: CPT

## 2021-04-04 RX ORDER — IPRATROPIUM BROMIDE AND ALBUTEROL SULFATE 2.5; .5 MG/3ML; MG/3ML
3 SOLUTION RESPIRATORY (INHALATION) ONCE
Status: COMPLETED | OUTPATIENT
Start: 2021-04-04 | End: 2021-04-04

## 2021-04-04 RX ORDER — BENZONATATE 100 MG/1
100 CAPSULE ORAL 3 TIMES DAILY PRN
Qty: 12 CAPSULE | Refills: 0 | Status: SHIPPED | OUTPATIENT
Start: 2021-04-04 | End: 2021-05-20

## 2021-04-04 RX ORDER — LORATADINE 10 MG/1
10 TABLET ORAL DAILY
Qty: 30 TABLET | Refills: 0 | Status: SHIPPED | OUTPATIENT
Start: 2021-04-04 | End: 2022-01-13

## 2021-04-04 RX ORDER — PREDNISONE 20 MG/1
TABLET ORAL
Qty: 18 TABLET | Refills: 0 | Status: SHIPPED | OUTPATIENT
Start: 2021-04-04 | End: 2021-04-13

## 2021-04-04 RX ADMIN — PREDNISONE 60 MG: 50 TABLET ORAL at 11:08

## 2021-04-04 RX ADMIN — IPRATROPIUM BROMIDE AND ALBUTEROL SULFATE 3 ML: .5; 3 SOLUTION RESPIRATORY (INHALATION) at 11:20

## 2021-04-04 NOTE — ED PROVIDER NOTES
"TRIAGE CHIEF COMPLAINT:     Nursing and triage notes reviewed    Chief Complaint   Patient presents with   • Cough   • Bronchitis      HPI: Markell Chilel is a 60 y.o. male who presents to the emergency department complaining of a 10-day history of cough, wheezing, sputum production.  Patient states that he had gone to the [facility] previously where he was diagnosed with bronchitis and placed on steroids and antibiotics.  He states that he feels much better than he did but is still having a lot of wheezing and coughing and states he does not feel back to normal.  Patient does smoke cigarettes.  He is not had any fevers or chills.  He denies any chest pain.  He was tested for Covid several days previously which was negative.    REVIEW OF SYSTEMS: All other systems reviewed and are negative     PAST MEDICAL HISTORY:   Past Medical History:   Diagnosis Date   • Anxiety    • Arthritis    • GERD (gastroesophageal reflux disease)    • Hx of exercise stress test 2014    IN [place]-\"IT WAS NORMAL\"   • Rotator cuff syndrome    • Wears contact lenses         FAMILY HISTORY:   History reviewed. No pertinent family history.     SOCIAL HISTORY:   Social History     Socioeconomic History   • Marital status: Single     Spouse name: Not on file   • Number of children: Not on file   • Years of education: Not on file   • Highest education level: Not on file   Tobacco Use   • Smoking status: Current Every Day Smoker     Years: 30.00     Types: Cigars   • Smokeless tobacco: Never Used   • Tobacco comment: 3-4 PER DAY   Vaping Use   • Vaping Use: Never used   Substance and Sexual Activity   • Alcohol use: Yes     Alcohol/week: 12.0 standard drinks     Types: 12 Cans of beer per week   • Drug use: No   • Sexual activity: Defer        SURGICAL HISTORY:   Past Surgical History:   Procedure Laterality Date   • COLONOSCOPY     • ENDOSCOPY     • SHOULDER ARTHROSCOPY Right 12/13/2018    Procedure: shoulder arthroscopy, right,  " subacromial decompression, distal clavicle excision and acromioplasty, mini-open rotator cuff repair;  Surgeon: Camilo Miller MD;  Location: [facility];  Service: Orthopedics   • SHOULDER SURGERY Left    • WISDOM TOOTH EXTRACTION          CURRENT MEDICATIONS:      Medication List      ASK your doctor about these medications    albuterol sulfate  (90 Base) MCG/ACT inhaler  Commonly known as: PROVENTIL HFA;VENTOLIN HFA;PROAIR HFA  Inhale 2 puffs Every 4 (Four) Hours As Needed for Wheezing.     brompheniramine-pseudoephedrine-DM 30-2-10 MG/5ML syrup  Commonly known as: Bromfed DM  Take 5 mL by mouth 4 (Four) Times a Day As Needed for Cough.     fluticasone 50 MCG/ACT nasal spray  Commonly known as: FLONASE  1-2 sprays each nostril daily     HYDROcodone-acetaminophen 7.5-325 MG per tablet  Commonly known as: NORCO     loperamide 2 MG capsule  Commonly known as: IMODIUM  Take 1 capsule by mouth 4 (Four) Times a Day As Needed for Diarrhea.     omeprazole 20 MG capsule  Commonly known as: priLOSEC     predniSONE 10 MG tablet  Commonly known as: DELTASONE  Daily taper - 6/5/4/3/2/1     promethazine-codeine 6.25-10 MG/5ML syrup  Commonly known as: PHENERGAN with CODEINE  Take 5 ml every 4 hours as needed for cough or pain             ALLERGIES: Penicillins     PHYSICAL EXAM:   VITAL SIGNS:   Vitals:    04/04/21 1028   BP: 149/88   Pulse: 89   Resp: 16   Temp: 97.7 °F (36.5 °C)   SpO2: 96%      CONSTITUTIONAL: Awake, oriented, appears non-toxic   HENT: Atraumatic, normocephalic, oral mucosa pink and moist, airway patent. Nares patent without drainage. External ears normal.   EYES: Conjunctiva clear   NECK: Trachea midline   CARDIOVASCULAR: Normal heart rate, Normal rhythm, No murmurs, rubs, gallops   PULMONARY/CHEST: Frequent coughing.  End expiratory wheezes appreciated.  Overall good air movement.  No increased work of breathing.  ABDOMINAL: Non-distended, soft, non-tender - no rebound or guarding.  NEUROLOGIC:  Non-focal, moving all four extremities, no gross sensory or motor deficits.   EXTREMITIES: No clubbing, cyanosis, or edema   SKIN: Warm, Dry, No erythema, No rash     ED COURSE / MEDICAL DECISION MAKING:   Markell Chilel is a 60 y.o. male who presents to the emergency department for evaluation of cough and congestion.  Patient is nondistressed on arrival in the emergency department.  Vital signs are stable on arrival.  Physical examination reveals end expiratory wheezes and frequent coughing but otherwise is largely unremarkable.  Patient's description of symptoms and physical exam suggest most likely viral cause of his symptoms.  Do not feel repeat Covid test is indicated at this time.  I will obtain a chest x-ray since patient states that was not done previously.    Chest x-ray obtained and does not reveal any obvious acute cardiopulmonary process according to the radiologist.    We will continue to treat the patient symptomatically with return precautions.    DECISION TO DISCHARGE/ADMIT: see ED care timeline     FINAL IMPRESSION:   1 --cough  2 --   3 --     Electronically signed by: Mago Alba MD, 4/4/2021 11:00 Mago Kebede MD  04/04/21 8741

## 2021-05-20 ENCOUNTER — OFFICE VISIT (OUTPATIENT)
Dept: SURGERY | Facility: CLINIC | Age: 61
End: 2021-05-20

## 2021-05-20 VITALS
OXYGEN SATURATION: 98 % | TEMPERATURE: 97.5 F | SYSTOLIC BLOOD PRESSURE: 126 MMHG | BODY MASS INDEX: 27.9 KG/M2 | DIASTOLIC BLOOD PRESSURE: 74 MMHG | HEIGHT: 72 IN | WEIGHT: 206 LBS | HEART RATE: 86 BPM

## 2021-05-20 DIAGNOSIS — C44.320 SQUAMOUS CELL CARCINOMA OF SKIN OF FACE: Primary | ICD-10-CM

## 2021-05-20 PROCEDURE — 99203 OFFICE O/P NEW LOW 30 MIN: CPT | Performed by: SURGERY

## 2021-05-20 RX ORDER — SODIUM CHLORIDE 0.9 % (FLUSH) 0.9 %
10 SYRINGE (ML) INJECTION AS NEEDED
Status: CANCELLED | OUTPATIENT
Start: 2021-05-20

## 2021-05-20 RX ORDER — DOXYCYCLINE HYCLATE 100 MG/1
CAPSULE ORAL
COMMUNITY
Start: 2021-04-21 | End: 2021-05-20

## 2021-05-20 RX ORDER — SODIUM CHLORIDE 0.9 % (FLUSH) 0.9 %
3 SYRINGE (ML) INJECTION EVERY 12 HOURS SCHEDULED
Status: CANCELLED | OUTPATIENT
Start: 2021-05-20

## 2021-05-20 RX ORDER — SODIUM CHLORIDE, SODIUM LACTATE, POTASSIUM CHLORIDE, CALCIUM CHLORIDE 600; 310; 30; 20 MG/100ML; MG/100ML; MG/100ML; MG/100ML
50 INJECTION, SOLUTION INTRAVENOUS CONTINUOUS
Status: CANCELLED | OUTPATIENT
Start: 2021-05-20

## 2021-05-24 ENCOUNTER — HOSPITAL ENCOUNTER (OUTPATIENT)
Facility: HOSPITAL | Age: 61
Setting detail: HOSPITAL OUTPATIENT SURGERY
Discharge: HOME | End: 2021-05-24
Attending: SURGERY | Admitting: SURGERY

## 2021-05-24 ENCOUNTER — ANESTHESIA (OUTPATIENT)
Dept: PERIOP | Facility: HOSPITAL | Age: 61
End: 2021-05-24

## 2021-05-24 ENCOUNTER — ANESTHESIA EVENT (OUTPATIENT)
Dept: PERIOP | Facility: HOSPITAL | Age: 61
End: 2021-05-24

## 2021-05-24 VITALS
HEIGHT: 72 IN | BODY MASS INDEX: 27.9 KG/M2 | TEMPERATURE: 98.3 F | RESPIRATION RATE: 18 BRPM | WEIGHT: 206 LBS | HEART RATE: 80 BPM | DIASTOLIC BLOOD PRESSURE: 99 MMHG | OXYGEN SATURATION: 100 % | SYSTOLIC BLOOD PRESSURE: 174 MMHG

## 2021-05-24 DIAGNOSIS — C44.320 SQUAMOUS CELL CARCINOMA OF SKIN OF FACE: ICD-10-CM

## 2021-05-24 PROCEDURE — 25010000003 LIDOCAINE 1 % SOLUTION 20 ML VIAL: Performed by: SURGERY

## 2021-05-24 PROCEDURE — 25010000002 MIDAZOLAM PER 1MG: Performed by: NURSE ANESTHETIST, CERTIFIED REGISTERED

## 2021-05-24 PROCEDURE — 25010000003 LIDOCAINE 1 % SOLUTION: Performed by: SURGERY

## 2021-05-24 PROCEDURE — 11643 EXC F/E/E/N/L MAL+MRG 2.1-3: CPT | Performed by: SURGERY

## 2021-05-24 PROCEDURE — 12051 INTMD RPR FACE/MM 2.5 CM/<: CPT | Performed by: SURGERY

## 2021-05-24 PROCEDURE — 25010000002 PROPOFOL 10 MG/ML EMULSION: Performed by: NURSE ANESTHETIST, CERTIFIED REGISTERED

## 2021-05-24 RX ORDER — HYDROCODONE BITARTRATE AND ACETAMINOPHEN 7.5; 325 MG/1; MG/1
1 TABLET ORAL ONCE AS NEEDED
Status: CANCELLED | OUTPATIENT
Start: 2021-05-24 | End: 2021-05-31

## 2021-05-24 RX ORDER — ONDANSETRON 2 MG/ML
4 INJECTION INTRAMUSCULAR; INTRAVENOUS ONCE AS NEEDED
Status: CANCELLED | OUTPATIENT
Start: 2021-05-24

## 2021-05-24 RX ORDER — KETAMINE HYDROCHLORIDE 50 MG/ML
INJECTION, SOLUTION, CONCENTRATE INTRAMUSCULAR; INTRAVENOUS AS NEEDED
Status: DISCONTINUED | OUTPATIENT
Start: 2021-05-24 | End: 2021-05-24 | Stop reason: SURG

## 2021-05-24 RX ORDER — SODIUM CHLORIDE 0.9 % (FLUSH) 0.9 %
10 SYRINGE (ML) INJECTION AS NEEDED
Status: DISCONTINUED | OUTPATIENT
Start: 2021-05-24 | End: 2021-05-24 | Stop reason: HOSPADM

## 2021-05-24 RX ORDER — MAGNESIUM HYDROXIDE 1200 MG/15ML
LIQUID ORAL AS NEEDED
Status: DISCONTINUED | OUTPATIENT
Start: 2021-05-24 | End: 2021-05-24 | Stop reason: HOSPADM

## 2021-05-24 RX ORDER — CLINDAMYCIN PHOSPHATE 900 MG/50ML
900 INJECTION, SOLUTION INTRAVENOUS ONCE
Status: COMPLETED | OUTPATIENT
Start: 2021-05-24 | End: 2021-05-24

## 2021-05-24 RX ORDER — SODIUM CHLORIDE, SODIUM LACTATE, POTASSIUM CHLORIDE, CALCIUM CHLORIDE 600; 310; 30; 20 MG/100ML; MG/100ML; MG/100ML; MG/100ML
50 INJECTION, SOLUTION INTRAVENOUS CONTINUOUS
Status: DISCONTINUED | OUTPATIENT
Start: 2021-05-24 | End: 2021-05-24 | Stop reason: HOSPADM

## 2021-05-24 RX ORDER — MIDAZOLAM HYDROCHLORIDE 2 MG/2ML
INJECTION, SOLUTION INTRAMUSCULAR; INTRAVENOUS AS NEEDED
Status: DISCONTINUED | OUTPATIENT
Start: 2021-05-24 | End: 2021-05-24 | Stop reason: SURG

## 2021-05-24 RX ORDER — SODIUM CHLORIDE 0.9 % (FLUSH) 0.9 %
3 SYRINGE (ML) INJECTION EVERY 12 HOURS SCHEDULED
Status: DISCONTINUED | OUTPATIENT
Start: 2021-05-24 | End: 2021-05-24 | Stop reason: HOSPADM

## 2021-05-24 RX ORDER — LIDOCAINE HYDROCHLORIDE 10 MG/ML
INJECTION, SOLUTION INFILTRATION; PERINEURAL AS NEEDED
Status: DISCONTINUED | OUTPATIENT
Start: 2021-05-24 | End: 2021-05-24 | Stop reason: HOSPADM

## 2021-05-24 RX ORDER — MEPERIDINE HYDROCHLORIDE 25 MG/ML
12.5 INJECTION INTRAMUSCULAR; INTRAVENOUS; SUBCUTANEOUS
Status: CANCELLED | OUTPATIENT
Start: 2021-05-24 | End: 2021-05-25

## 2021-05-24 RX ORDER — LIDOCAINE HYDROCHLORIDE 20 MG/ML
INJECTION, SOLUTION INTRAVENOUS AS NEEDED
Status: DISCONTINUED | OUTPATIENT
Start: 2021-05-24 | End: 2021-05-24 | Stop reason: SURG

## 2021-05-24 RX ORDER — PROPOFOL 10 MG/ML
VIAL (ML) INTRAVENOUS AS NEEDED
Status: DISCONTINUED | OUTPATIENT
Start: 2021-05-24 | End: 2021-05-24 | Stop reason: SURG

## 2021-05-24 RX ADMIN — CLINDAMYCIN PHOSPHATE 900 MG: 900 INJECTION, SOLUTION INTRAVENOUS at 12:27

## 2021-05-24 RX ADMIN — KETAMINE HYDROCHLORIDE 25 MG: 50 INJECTION, SOLUTION INTRAMUSCULAR; INTRAVENOUS at 12:24

## 2021-05-24 RX ADMIN — LIDOCAINE HYDROCHLORIDE 60 MG: 20 INJECTION, SOLUTION INTRAVENOUS at 12:16

## 2021-05-24 RX ADMIN — PROPOFOL 30 MG: 10 INJECTION, EMULSION INTRAVENOUS at 12:16

## 2021-05-24 RX ADMIN — KETAMINE HYDROCHLORIDE 25 MG: 50 INJECTION, SOLUTION INTRAMUSCULAR; INTRAVENOUS at 12:16

## 2021-05-24 RX ADMIN — MIDAZOLAM HYDROCHLORIDE 2 MG: 1 INJECTION, SOLUTION INTRAMUSCULAR; INTRAVENOUS at 12:16

## 2021-05-24 RX ADMIN — PROPOFOL 50 MG: 10 INJECTION, EMULSION INTRAVENOUS at 12:24

## 2021-05-24 RX ADMIN — SODIUM CHLORIDE, POTASSIUM CHLORIDE, SODIUM LACTATE AND CALCIUM CHLORIDE 50 ML/HR: 600; 310; 30; 20 INJECTION, SOLUTION INTRAVENOUS at 10:22

## 2021-05-24 NOTE — ANESTHESIA POSTPROCEDURE EVALUATION
Patient: Markell Chilel    Procedure Summary     Date: 05/24/21 Room / Location: [facility] OR  /  [facility]    Anesthesia Start: 1217 Anesthesia Stop: 1245    Procedure: WIDE EXCISION SQUAMOUS CELL CARCINOMA LEFT TEMPLE WITH FROZEN SECTION (Left ) Diagnosis:       Squamous cell carcinoma of skin of face      (Squamous cell carcinoma of skin of face [C44.320])    Surgeons: Kamilla Bruner MD Provider: Faustino James CRNA    Anesthesia Type: MAC ASA Status: 2          Anesthesia Type: MAC    Vitals  Vitals Value Taken Time   /99 05/24/21 1329   Temp 98.3 °F (36.8 °C) 05/24/21 1245   Pulse 80 05/24/21 1329   Resp 18 05/24/21 1329   SpO2 100 % 05/24/21 1329           Post Anesthesia Care and Evaluation    Patient location during evaluation: bedside  Patient participation: complete - patient participated  Level of consciousness: awake  Pain score: 0  Pain management: adequate  Airway patency: patent  Anesthetic complications: No anesthetic complications  PONV Status: controlled  Cardiovascular status: acceptable and stable  Respiratory status: acceptable and room air  Hydration status: acceptable

## 2021-05-24 NOTE — ANESTHESIA PREPROCEDURE EVALUATION
Anesthesia Evaluation     Patient summary reviewed and Nursing notes reviewed   NPO Solid Status: > 8 hours  NPO Liquid Status: > 8 hours           Airway   Mallampati: II  TM distance: >3 FB  Neck ROM: full  No difficulty expected  Dental - normal exam     Pulmonary - normal exam   (+) a smoker Current Smoked day of surgery,   Cardiovascular - negative cardio ROS and normal exam  Exercise tolerance: excellent (>7 METS)        Neuro/Psych  (+) psychiatric history Anxiety,     GI/Hepatic/Renal/Endo    (+)  GERD,      Musculoskeletal     Abdominal  - normal exam    Bowel sounds: normal.   Substance History - negative use     OB/GYN negative ob/gyn ROS         Other   arthritis,      ROS/Med Hx Other: + nasal congestion  - fever,cough, ST                    Anesthesia Plan    ASA 2     MAC   (Preoperative bracial plexus with catheter and onQ pump)  intravenous induction     Anesthetic plan, all risks, benefits, and alternatives have been provided, discussed and informed consent has been obtained with: patient.    Plan discussed with CRNA.

## 2021-05-28 LAB
LAB AP CASE REPORT: NORMAL
LAB AP CLINICAL INFORMATION: NORMAL
PATH REPORT.FINAL DX SPEC: NORMAL

## 2021-06-09 NOTE — OP NOTE
Markell Chilel  5/24/2021    Pre-op Diagnosis:   Squamous cell carcinoma of skin of face [C44.320]       Post-Op Diagnosis Codes:     * Squamous cell carcinoma of skin of face [C44.320]      Procedure(s):  WIDE EXCISION SQUAMOUS CELL CARCINOMA LEFT TEMPLE WITH FROZEN SECTION    Surgeon(s):  Kamilla Bruner MD    Anesthesia: Local + MAC    Staff:   Circulator: Mali Knight RN  Scrub Person: Sophia Nichole         Estimated Blood Loss: minimal    Urine Voided: * No values recorded between 5/24/2021 11:18 AM and 5/24/2021 12:41 PM *    Specimens:                Specimens     ID Source Type Tests Collected By Collected At Frozen?    A Forehead Tissue · TISSUE PATHOLOGY EXAM   Mali Knight RN 5/24/21 1139 Yes    Description: left temple squamous cell carinoma for margins only--short stitch superior; long stitch lateral    Comment: Short stitch superior; long stitch lateral     This specimen was not marked as sent.    B Forehead Tissue · TISSUE PATHOLOGY EXAM   Mali Kinght RN 5/24/21 1218 No    Description: left temple squamous cell carinoma additional deep margin    This specimen was not marked as sent.          Indications for the procedure: Mr. Chilel is a 60-year-old gentleman referred to me by Dr. Solis for definitive management of a squamous cell carcinoma of the left temple which was previously evaluated with a shave biopsy in her office.  The outside pathology report was reviewed and revealed an ulcerated, invasive squamous cell carcinoma with basaloid features.  All margins were positive.  The biopsy specimen was reported to be 1.2 x 0.9 cm with a 3 mm pigmented lesion in the specimen.  A wide local excision was recommended to achieve definitive management and clear margins.  We discussed the use of intraoperative frozen section to check the margins.  The patient was advised that this would be best performed in the operating room versus in an office setting, and I recommended that it be done  under sedation.  He initially was agreeable but later wished to proceed under local only.  He is now being brought to the operating room for the scheduled surgical procedure, having been counseled regarding the risk, benefits, and alternatives.    Description of the procedure: The patient was seen and examined preoperatively in the holding area on the day of the scheduled surgical procedure.  The left temple lesion was marked.  The patient was taken to the operating room initially placed supine on the operating table, where a timeout was performed using a WHO checklist.  Despite being scheduled as a MAC case, the patient insisted that he no longer wanted to be sedated, and wished to proceed with local anesthesia only.  We initially began the case this way as per his wishes, with anesthesia on standby.      The patient was positioned, and the left face was prepped and draped in the usual sterile fashion.  Immediately, the patient began to get very anxious, and was pulling at the drapes, shaking his head, pulling his arms out of the arm straps trying to move the drapes due to anxiety and claustrophobia.  He was offered sedation multiple times.  Ultimately, we had to redrape in order to expose a significant part of his face, as he became extremely anxious and claustrophobic but still refused sedation.  Once this was done, the area of concern was reidentified, and the excision was marked in elliptical fashion.  The ellipse was fashioned such that it would encompass the entire visible shave biopsy site.  This was then anesthetized with infiltration of lidocaine until adequate local anesthesia was achieved.  Once this was done a 15 blade knife was used to incise the skin following the previously drawn ellipse.  This was deepened through the full-thickness of the subcutaneous tissue, and hemostasis was achieved with manual pressure, electrocautery, and ultimately suture ligatures.  Once the full-thickness of the tissue had  been excised, the specimen was oriented, and sent to pathology for frozen section evaluation of the margins.  The total excised specimen was 2.5 cm x 1 cm.  While the frozen section was being performed, attention was turned to closure of the wound due to ongoing significant anxiety of the patient, and in the interest of time.  Hemostasis was achieved, and the subcutaneous tissue was reapproximated with multiple interrupted 4-0 Vicryl sutures.  The skin was then closed in a running, subcuticular fashion using 5-0 PDS.  Manual pressure was then applied to the site while continuing to wait for pathology evaluation.    Ultimately, the pathologist called in the room and stated that he was highly suspicious for a positive deep margin.  I explained this to the patient and discussed with him that we would need to open the incision, and take a little deeper section of tissue posteriorly in order to completely clear the margin.  He became extremely anxious with this and wanted to immediately leave the operating room.  I explained to him that this was not ideal as he would require additional surgery and it would be more difficult to identify the previously positive margin after the wound had healed.  Ultimately, he became agreeable to conversion to a MAC case, at which time anesthesia administered a number of medications to achieve adequate sedation so that the case could be completed.  The wound was reopened and the previous sutures removed.  The small amount of residual subcutaneous fat overlying the muscle was sharply excised, and sent to pathology as a new deep margin.  Hemostasis was again achieved, and the wound was then reclosed using multiple interrupted 3-0 Vicryl sutures followed by again a 5-0 PDS placed in a subcuticular fashion in the skin.  Mastisol and Steri-Strips were applied, followed by a dry sterile dressing.  The patient was then awakened from anesthesia, and taken to recovery room in good condition.   Intraoperative findings were discussed with the patient's wife postoperatively.  There were no immediate surgical complications, and all counts were correct.      Drains: * No LDAs found *    Findings: Initial report from the intraoperative frozen section was suspicious for positive deep margins.    Complications: None          Kamilla Bruner MD

## 2021-06-14 NOTE — PROGRESS NOTES
Subjective   Markell Chilel is a 60 y.o. male.   Chief Complaint   Patient presents with   • Post-op     excision     Patient is here for an evaluation and follow up of his recent lesion excision. Pathology report did show basal cell carcinoma with focal squamous differentiation, deep margin focally suspicious for malignancy on frozen section. He states he is doing well.     History of Present Illness     The following portions of the patient's history were reviewed and updated as appropriate: allergies, current medications, past family history, past medical history, past social history, past surgical history and problem list.    Review of Systems   Constitutional: Negative for chills, fever and unexpected weight change.   HENT: Negative for trouble swallowing and voice change.    Eyes: Negative for visual disturbance.   Respiratory: Negative for apnea, cough, chest tightness, shortness of breath and wheezing.    Cardiovascular: Negative for chest pain, palpitations and leg swelling.   Gastrointestinal: Negative for abdominal distention, abdominal pain, anal bleeding, blood in stool, constipation, diarrhea, nausea, rectal pain and vomiting.   Endocrine: Negative for cold intolerance and heat intolerance.   Genitourinary: Negative for difficulty urinating, dysuria, flank pain, scrotal swelling and testicular pain.   Musculoskeletal: Negative for back pain, gait problem and joint swelling.   Skin: Negative for color change, rash and wound.   Neurological: Negative for dizziness, syncope, speech difficulty, weakness, numbness and headaches.   Hematological: Negative for adenopathy. Does not bruise/bleed easily.   Psychiatric/Behavioral: Negative for confusion. The patient is not nervous/anxious.        Objective   Physical Exam    Assessment/Plan   Diagnoses and all orders for this visit:    1. Basal cell carcinoma (BCC) of skin of other part of face (Primary)

## 2021-06-16 ENCOUNTER — OFFICE VISIT (OUTPATIENT)
Dept: SURGERY | Facility: CLINIC | Age: 61
End: 2021-06-16

## 2021-06-16 VITALS
SYSTOLIC BLOOD PRESSURE: 160 MMHG | HEIGHT: 72 IN | WEIGHT: 204.6 LBS | OXYGEN SATURATION: 96 % | BODY MASS INDEX: 27.71 KG/M2 | TEMPERATURE: 98 F | DIASTOLIC BLOOD PRESSURE: 90 MMHG | HEART RATE: 90 BPM

## 2021-06-16 DIAGNOSIS — C44.319 BASAL CELL CARCINOMA (BCC) OF SKIN OF OTHER PART OF FACE: Primary | ICD-10-CM

## 2021-06-16 PROCEDURE — 99024 POSTOP FOLLOW-UP VISIT: CPT | Performed by: SURGERY

## 2021-08-31 ENCOUNTER — TRANSCRIBE ORDERS (OUTPATIENT)
Dept: ADMINISTRATIVE | Facility: HOSPITAL | Age: 61
End: 2021-08-31

## 2021-08-31 DIAGNOSIS — R19.00 REDUCIBLE BULGE OF ABDOMINAL WALL: Primary | ICD-10-CM

## 2021-09-17 ENCOUNTER — HOSPITAL ENCOUNTER (OUTPATIENT)
Dept: ULTRASOUND IMAGING | Facility: HOSPITAL | Age: 61
Discharge: HOME | End: 2021-09-17

## 2021-09-17 ENCOUNTER — LAB (OUTPATIENT)
Dept: LAB | Facility: HOSPITAL | Age: 61
End: 2021-09-17

## 2021-09-17 ENCOUNTER — TRANSCRIBE ORDERS (OUTPATIENT)
Dept: LAB | Facility: HOSPITAL | Age: 61
End: 2021-09-17

## 2021-09-17 DIAGNOSIS — Z20.822 COVID-19 RULED OUT: ICD-10-CM

## 2021-09-17 DIAGNOSIS — Z20.822 COVID-19 RULED OUT: Primary | ICD-10-CM

## 2021-09-17 DIAGNOSIS — R19.00 REDUCIBLE BULGE OF ABDOMINAL WALL: ICD-10-CM

## 2021-09-17 PROCEDURE — U0004 COV-19 TEST NON-CDC HGH THRU: HCPCS

## 2021-09-17 PROCEDURE — C9803 HOPD COVID-19 SPEC COLLECT: HCPCS

## 2021-09-17 PROCEDURE — 76700 US EXAM ABDOM COMPLETE: CPT

## 2021-09-18 LAB — SARS-COV-2 RNA NOSE QL NAA+PROBE: NOT DETECTED

## 2021-09-21 ENCOUNTER — TELEPHONE (OUTPATIENT)
Dept: OTHER | Facility: HOSPITAL | Age: 61
End: 2021-09-21

## 2021-10-19 ENCOUNTER — TRANSCRIBE ORDERS (OUTPATIENT)
Dept: ADMINISTRATIVE | Facility: HOSPITAL | Age: 61
End: 2021-10-19

## 2021-10-19 DIAGNOSIS — R19.00 REDUCIBLE BULGE OF ABDOMINAL WALL: Primary | ICD-10-CM

## 2021-11-11 ENCOUNTER — HOSPITAL ENCOUNTER (OUTPATIENT)
Dept: CT IMAGING | Facility: HOSPITAL | Age: 61
Discharge: HOME | End: 2021-11-11
Admitting: NURSE PRACTITIONER

## 2021-11-11 DIAGNOSIS — R19.00 REDUCIBLE BULGE OF ABDOMINAL WALL: ICD-10-CM

## 2021-11-11 PROCEDURE — 74150 CT ABDOMEN W/O CONTRAST: CPT

## 2021-12-06 NOTE — PROGRESS NOTES
"Subjective   Markell Chilel is a 61 y.o. male.   Chief Complaint   Patient presents with   • Hernia     periumbilical     History of Present Illness   Mr. Chilel is a 62 yo male patient known to me from prior encounters, who comes the office for evaluation of abdominal pain, abdominal bulge, abnormal CT scan of the stomach.  He complains of \"excessive gurgling\" of his stomach.  He does have a history of gastroesophageal reflux, and is on low-dose omeprazole for this.  He is a longtime cigar smoker, and drinks regularly, mostly beer.  He denies the use of smokeless tobacco, or illicit drugs.  He describes noting a bulge in the upper abdomen, which is most prevalent when he is straining, or lifting.  He also notices this when going from sitting to standing.  He recently had an ultrasound and a CT scan performed for further investigation.  His ultrasound was unremarkable as detailed below.  The CT demonstrated wall thickening in the cardia and body of the stomach.  A small, fat-containing periumbilical hernia was also noted.     My review the CT, the patient was noted to have a moderate rectus diastases, and the periumbilical hernia was about 7 to 8 mm in diameter.      The following portions of the patient's history were reviewed and updated as appropriate: allergies, current medications, past family history, past medical history, past social history, past surgical history and problem list.     Patient Active Problem List   Diagnosis   • Shoulder impingement syndrome, right   • Squamous cell carcinoma of skin of face   • Abnormal CT scan, stomach   • Epigastric pain       Past Medical History:   Diagnosis Date   • Anxiety    • Arthritis    • COVID-19 vaccine series completed     moderna   • GERD (gastroesophageal reflux disease)    • Hx of exercise stress test 2014    IN [place]-\"IT WAS NORMAL\"   • Rotator cuff syndrome     right   • Squamous cell cancer of skin of left temple    • Wears contact lenses  "       Past Surgical History:   Procedure Laterality Date   • COLONOSCOPY     • ENDOSCOPY     • SHOULDER ARTHROSCOPY Right 12/13/2018    Procedure: shoulder arthroscopy, right,  subacromial decompression, distal clavicle excision and acromioplasty, mini-open rotator cuff repair;  Surgeon: Camilo Miller MD;  Location: [facility];  Service: Orthopedics   • SHOULDER SURGERY Left    • WIDE EXCISION HEAD/NECK LESION/MASS Left 5/24/2021    Procedure: WIDE EXCISION SQUAMOUS CELL CARCINOMA LEFT TEMPLE WITH FROZEN SECTION;  Surgeon: Kamilla Bruner MD;  Location: [facility];  Service: General;  Laterality: Left;   • WISDOM TOOTH EXTRACTION         Medications:   No current facility-administered medications on file prior to visit.     Current Outpatient Medications on File Prior to Visit   Medication Sig   • HYDROcodone-acetaminophen (NORCO) 7.5-325 MG per tablet Take 1 tablet by mouth Every 6 (Six) Hours As Needed for Moderate Pain .   • omeprazole (priLOSEC) 20 MG capsule Take 20 mg by mouth Daily.   • loratadine (CLARITIN) 10 MG tablet Take 1 tablet by mouth Daily.       Allergies:   Allergies   Allergen Reactions   • Penicillins Hives         Family History   Problem Relation Age of Onset   • Hypertension Mother    • No Known Problems Father        Social History     Socioeconomic History   • Marital status: Single   Tobacco Use   • Smoking status: Current Every Day Smoker     Years: 30.00     Types: Cigars   • Smokeless tobacco: Never Used   • Tobacco comment: 3-4 PER DAY   Vaping Use   • Vaping Use: Never used   Substance and Sexual Activity   • Alcohol use: Yes     Alcohol/week: 14.0 standard drinks     Types: 14 Cans of beer per week     Comment: 14 beers weekly   • Drug use: No   • Sexual activity: Defer         Review of Systems   Constitutional: Negative for chills, fever and unexpected weight change.   HENT: Negative for trouble swallowing and voice change.    Eyes: Negative for visual disturbance.  "  Respiratory: Negative for apnea, cough, chest tightness, shortness of breath and wheezing.    Cardiovascular: Negative for chest pain, palpitations and leg swelling.   Gastrointestinal: Positive for abdominal pain. Negative for abdominal distention, anal bleeding, blood in stool, constipation, diarrhea, nausea, rectal pain and vomiting.   Endocrine: Negative for cold intolerance and heat intolerance.   Genitourinary: Negative for difficulty urinating, dysuria, flank pain, scrotal swelling and testicular pain.   Musculoskeletal: Negative for back pain, gait problem and joint swelling.   Skin: Negative for color change, rash and wound.   Neurological: Negative for dizziness, syncope, speech difficulty, weakness, numbness and headaches.   Hematological: Negative for adenopathy. Does not bruise/bleed easily.   Psychiatric/Behavioral: Negative for confusion. The patient is not nervous/anxious.        Objective    /82   Pulse 82   Temp 98 °F (36.7 °C) (Temporal)   Resp 18   Ht 182.9 cm (72\")   Wt 94.8 kg (209 lb)   SpO2 98%   BMI 28.35 kg/m²     Physical Exam  Constitutional:       Appearance: He is well-developed.   HENT:      Head: Normocephalic and atraumatic.   Eyes:      General: No scleral icterus.     Pupils: Pupils are equal, round, and reactive to light.   Cardiovascular:      Rate and Rhythm: Regular rhythm.   Pulmonary:      Effort: Pulmonary effort is normal.   Abdominal:      General: There is no distension.      Palpations: Abdomen is soft.      Tenderness: There is no abdominal tenderness.   Skin:     General: Skin is warm and dry.   Neurological:      Mental Status: He is alert and oriented to person, place, and time.   Psychiatric:         Behavior: Behavior normal.           Outside Records:  I have taken the opportunity to review the patient's available outside records in paper format, scanned format and those available on Care Everywhere    Results Review:  I have reviewed the entirety " of the patient's clinical lab results.  I have also personally reviewed the relevant patient imaging.       PROCEDURE: US ABDOMEN COMPLETE-     HISTORY: R19.00; R19.00-Intra-abdominal and pelvic swelling, mass and  lump, unspecified site     PROCEDURE: Ultrasound images of the abdomen were obtained.     FINDINGS: .  The visualized pancreas is unremarkable. The liver is unremarkable. The  gallbladder Is unremarkable with no shadowing stones or wall thickening.   The common duct measures 2.50 mm . The right kidney measures  12.9 cm  in length and is normal in echogenicity without hydronephrosis. The left  kidney measures  13.6 cm in length and is normal in echogenicity without  hydronephrosis.  Spleen is unremarkable.  The aorta is normal in  caliber. The vena cava is unremarkable. No mass is seen in the anterior  abdominal body wall.     IMPRESSION:  Normal ultrasound of the abdomen.      This report was finalized on 9/17/2021 2:14 PM by Pedro Castillo M.D..    PROCEDURE: CT ABDOMEN WO CONTRAST-     HISTORY: REDUCIBLE BULGE OF ABDOMINAL WALL; R19.00-Intra-abdominal and  pelvic swelling, mass and lump, unspecified site     COMPARISON: None .     PROCEDURE: Axial images were obtained from the lung bases through the  abdomen without intravenous contrast. .      FINDINGS:      ABDOMEN: The lung bases are clear. The heart size is normal. The limited  noncontrast images of the liver are normal. The spleen is normal. No  adrenal masses are seen.  The pancreas has an unremarkable unenhanced  appearance.. The aorta is normal in caliber. There is no significant  free fluid or adenopathy.  There is no nephrolithiasis. There is no  hydronephrosis. There is wall thickening noted in the cardia of the  stomach and within the distal body of the stomach. Of the remainder of  the visualized GI tract is unremarkable. There is a small fat-containing  periumbilical hernia.     IMPRESSION:  1. Small fat-containing periumbilical  "hernia.  2. Wall thickening seen in the cardia and distal body of the stomach  which should be evaluated with endoscopy if not recently performed.              354.92 mGy.cm.  11.18 mGy     This study was performed with techniques to keep radiation doses as low  as reasonably achievable (ALARA). Individualized dose reduction  techniques using automated exposure control or adjustment of mA and/or  kV according to the patient size were employed.      This report was finalized on 11/12/2021 7:50 AM by Pedro Castillo M.D..        Assessment/Plan   Diagnoses and all orders for this visit:    1. Rectus diastasis (Primary)    2. Abnormal CT scan, stomach    3. Periumbilical hernia      Mr. Chilel is a 61-year-old gentleman known to me from prior encounters, who comes the office today for evaluation of abdominal pain with concern for a hernia, and abnormal CT scan of the stomach.  I had a detailed conversation with him regarding his small periumbilical hernia, which does not require repair.  His complaint of a \"hernia\" is really more related to his moderate rectus diastases which extends along the entire upper abdomen.  We discussed that this is not something that is managed surgically.  It is improved by weight loss, and physical therapy.    Regarding his CT scan, he does have significant thickening of his stomach on the scan, and upper GI endoscopy is recommended for further investigation.  We discussed EGD for diagnostic purposes..  We discussed the indications for upper endoscopy as well as the risks, benefits and alternatives to this procedure.   The patient was given an opportunity to ask questions.  The patient verbalized understanding of these recommendations and the plan of care. The patient is willing to proceed with endoscopy and has signed informed consent in the office today.  My office will arrange scheduling for the EGD procedure and pre-admission testing.           "

## 2021-12-07 ENCOUNTER — OFFICE VISIT (OUTPATIENT)
Dept: SURGERY | Facility: CLINIC | Age: 61
End: 2021-12-07

## 2021-12-07 VITALS
OXYGEN SATURATION: 98 % | BODY MASS INDEX: 28.31 KG/M2 | HEART RATE: 82 BPM | SYSTOLIC BLOOD PRESSURE: 138 MMHG | HEIGHT: 72 IN | TEMPERATURE: 98 F | RESPIRATION RATE: 18 BRPM | WEIGHT: 209 LBS | DIASTOLIC BLOOD PRESSURE: 82 MMHG

## 2021-12-07 DIAGNOSIS — R93.3 ABNORMAL CT SCAN, STOMACH: ICD-10-CM

## 2021-12-07 DIAGNOSIS — M62.08 RECTUS DIASTASIS: Primary | ICD-10-CM

## 2021-12-07 DIAGNOSIS — K42.9 PERIUMBILICAL HERNIA: ICD-10-CM

## 2021-12-07 PROCEDURE — 99213 OFFICE O/P EST LOW 20 MIN: CPT | Performed by: SURGERY

## 2021-12-14 ENCOUNTER — PREP FOR SURGERY (OUTPATIENT)
Dept: OTHER | Facility: HOSPITAL | Age: 61
End: 2021-12-14

## 2021-12-14 DIAGNOSIS — R10.13 EPIGASTRIC PAIN: ICD-10-CM

## 2021-12-14 DIAGNOSIS — R93.3 ABNORMAL CT SCAN, STOMACH: Primary | ICD-10-CM

## 2021-12-14 RX ORDER — SODIUM CHLORIDE 0.9 % (FLUSH) 0.9 %
3 SYRINGE (ML) INJECTION EVERY 12 HOURS SCHEDULED
Status: CANCELLED | OUTPATIENT
Start: 2021-12-14

## 2021-12-14 RX ORDER — SODIUM CHLORIDE 0.9 % (FLUSH) 0.9 %
10 SYRINGE (ML) INJECTION AS NEEDED
Status: CANCELLED | OUTPATIENT
Start: 2021-12-14

## 2021-12-14 RX ORDER — SODIUM CHLORIDE, SODIUM LACTATE, POTASSIUM CHLORIDE, CALCIUM CHLORIDE 600; 310; 30; 20 MG/100ML; MG/100ML; MG/100ML; MG/100ML
50 INJECTION, SOLUTION INTRAVENOUS CONTINUOUS
Status: CANCELLED | OUTPATIENT
Start: 2021-12-14

## 2021-12-20 ENCOUNTER — LAB (OUTPATIENT)
Dept: LAB | Facility: HOSPITAL | Age: 61
End: 2021-12-20

## 2021-12-20 DIAGNOSIS — Z01.818 PREOP TESTING: ICD-10-CM

## 2021-12-20 DIAGNOSIS — Z01.818 PREOP TESTING: Primary | ICD-10-CM

## 2021-12-20 PROBLEM — R93.3 ABNORMAL CT SCAN, STOMACH: Status: ACTIVE | Noted: 2021-12-20

## 2021-12-20 PROBLEM — R10.13 EPIGASTRIC PAIN: Status: ACTIVE | Noted: 2021-12-20

## 2021-12-20 PROCEDURE — C9803 HOPD COVID-19 SPEC COLLECT: HCPCS

## 2021-12-20 PROCEDURE — U0004 COV-19 TEST NON-CDC HGH THRU: HCPCS

## 2021-12-21 LAB — SARS-COV-2 RNA PNL SPEC NAA+PROBE: NOT DETECTED

## 2021-12-27 ENCOUNTER — LAB (OUTPATIENT)
Dept: LAB | Facility: HOSPITAL | Age: 61
End: 2021-12-27

## 2021-12-27 PROCEDURE — U0004 COV-19 TEST NON-CDC HGH THRU: HCPCS

## 2021-12-27 PROCEDURE — C9803 HOPD COVID-19 SPEC COLLECT: HCPCS

## 2021-12-28 LAB — SARS-COV-2 RNA NOSE QL NAA+PROBE: NOT DETECTED

## 2021-12-29 ENCOUNTER — HOSPITAL ENCOUNTER (OUTPATIENT)
Facility: HOSPITAL | Age: 61
Setting detail: HOSPITAL OUTPATIENT SURGERY
Discharge: HOME | End: 2021-12-29
Attending: SURGERY | Admitting: SURGERY

## 2021-12-29 ENCOUNTER — ANESTHESIA (OUTPATIENT)
Dept: GASTROENTEROLOGY | Facility: HOSPITAL | Age: 61
End: 2021-12-29

## 2021-12-29 ENCOUNTER — ANESTHESIA EVENT (OUTPATIENT)
Dept: GASTROENTEROLOGY | Facility: HOSPITAL | Age: 61
End: 2021-12-29

## 2021-12-29 VITALS
OXYGEN SATURATION: 99 % | WEIGHT: 210 LBS | TEMPERATURE: 98.1 F | SYSTOLIC BLOOD PRESSURE: 135 MMHG | RESPIRATION RATE: 16 BRPM | DIASTOLIC BLOOD PRESSURE: 65 MMHG | HEART RATE: 72 BPM | BODY MASS INDEX: 28.44 KG/M2 | HEIGHT: 72 IN

## 2021-12-29 DIAGNOSIS — R10.13 EPIGASTRIC PAIN: ICD-10-CM

## 2021-12-29 DIAGNOSIS — R93.3 ABNORMAL CT SCAN, STOMACH: ICD-10-CM

## 2021-12-29 PROCEDURE — 25010000002 PROPOFOL 10 MG/ML EMULSION: Performed by: NURSE ANESTHETIST, CERTIFIED REGISTERED

## 2021-12-29 PROCEDURE — 43239 EGD BIOPSY SINGLE/MULTIPLE: CPT | Performed by: SURGERY

## 2021-12-29 RX ORDER — SODIUM CHLORIDE 0.9 % (FLUSH) 0.9 %
10 SYRINGE (ML) INJECTION AS NEEDED
Status: DISCONTINUED | OUTPATIENT
Start: 2021-12-29 | End: 2021-12-29 | Stop reason: HOSPADM

## 2021-12-29 RX ORDER — LIDOCAINE HYDROCHLORIDE 20 MG/ML
INJECTION, SOLUTION INTRAVENOUS AS NEEDED
Status: DISCONTINUED | OUTPATIENT
Start: 2021-12-29 | End: 2021-12-29 | Stop reason: SURG

## 2021-12-29 RX ORDER — PROPOFOL 10 MG/ML
VIAL (ML) INTRAVENOUS AS NEEDED
Status: DISCONTINUED | OUTPATIENT
Start: 2021-12-29 | End: 2021-12-29 | Stop reason: SURG

## 2021-12-29 RX ORDER — SODIUM CHLORIDE 0.9 % (FLUSH) 0.9 %
3 SYRINGE (ML) INJECTION EVERY 12 HOURS SCHEDULED
Status: DISCONTINUED | OUTPATIENT
Start: 2021-12-29 | End: 2021-12-29 | Stop reason: HOSPADM

## 2021-12-29 RX ORDER — KETAMINE HYDROCHLORIDE 50 MG/ML
INJECTION, SOLUTION, CONCENTRATE INTRAMUSCULAR; INTRAVENOUS AS NEEDED
Status: DISCONTINUED | OUTPATIENT
Start: 2021-12-29 | End: 2021-12-29 | Stop reason: SURG

## 2021-12-29 RX ORDER — SODIUM CHLORIDE, SODIUM LACTATE, POTASSIUM CHLORIDE, CALCIUM CHLORIDE 600; 310; 30; 20 MG/100ML; MG/100ML; MG/100ML; MG/100ML
50 INJECTION, SOLUTION INTRAVENOUS CONTINUOUS
Status: DISCONTINUED | OUTPATIENT
Start: 2021-12-29 | End: 2021-12-29 | Stop reason: HOSPADM

## 2021-12-29 RX ADMIN — SODIUM CHLORIDE, POTASSIUM CHLORIDE, SODIUM LACTATE AND CALCIUM CHLORIDE 50 ML/HR: 600; 310; 30; 20 INJECTION, SOLUTION INTRAVENOUS at 07:55

## 2021-12-29 RX ADMIN — PROPOFOL 150 MG: 10 INJECTION, EMULSION INTRAVENOUS at 08:31

## 2021-12-29 RX ADMIN — GLYCOPYRROLATE 0.2 MG: 0.2 INJECTION, SOLUTION INTRAMUSCULAR; INTRAVITREAL at 08:19

## 2021-12-29 RX ADMIN — PROPOFOL 200 MG: 10 INJECTION, EMULSION INTRAVENOUS at 08:37

## 2021-12-29 RX ADMIN — PROPOFOL 100 MG: 10 INJECTION, EMULSION INTRAVENOUS at 08:42

## 2021-12-29 RX ADMIN — KETAMINE HYDROCHLORIDE 20 MG: 50 INJECTION, SOLUTION INTRAMUSCULAR; INTRAVENOUS at 08:25

## 2021-12-29 RX ADMIN — PROPOFOL 50 MG: 10 INJECTION, EMULSION INTRAVENOUS at 08:25

## 2021-12-29 RX ADMIN — LIDOCAINE HYDROCHLORIDE 40 MG: 20 INJECTION, SOLUTION INTRAVENOUS at 08:25

## 2021-12-29 NOTE — ANESTHESIA POSTPROCEDURE EVALUATION
Patient: Markell Chilel    Procedure Summary     Date: 12/29/21 Room / Location: [facility] ENDOSCOPY 2 / [facility]COPY    Anesthesia Start: 0819 Anesthesia Stop:     Procedure: ESOPHAGOGASTRODUODENOSCOPY WITH BIOPSY (N/A Esophagus) Diagnosis:       Abnormal CT scan, stomach      Epigastric pain      (Abnormal CT scan, stomach [R93.3])      (Epigastric pain [R10.13])    Surgeons: Kamilla Bruner MD Provider: Curtis Brandt CRNA    Anesthesia Type: MAC ASA Status: 2          Anesthesia Type: MAC    Vitals    Sat 96  /78  Resp 22  Temp 98        Post Anesthesia Care and Evaluation    Patient location during evaluation: bedside  Patient participation: complete - patient participated  Level of consciousness: awake and alert  Pain score: 0  Pain management: adequate  Airway patency: patent  Anesthetic complications: No anesthetic complications  PONV Status: none  Cardiovascular status: acceptable  Respiratory status: acceptable  Hydration status: acceptable

## 2021-12-29 NOTE — ANESTHESIA PREPROCEDURE EVALUATION
Anesthesia Evaluation     Patient summary reviewed and Nursing notes reviewed   NPO Solid Status: > 8 hours  NPO Liquid Status: > 8 hours           Airway   Mallampati: II  TM distance: >3 FB  Neck ROM: full  Possible difficult intubation  Dental - normal exam     Pulmonary - normal exam   (+) a smoker Current Smoked day of surgery,   Cardiovascular - negative cardio ROS and normal exam  Exercise tolerance: excellent (>7 METS)        Neuro/Psych  (+) psychiatric history Anxiety,     GI/Hepatic/Renal/Endo    (+)  GERD,      Musculoskeletal     Abdominal  - normal exam    Bowel sounds: normal.   Substance History   (+) alcohol use,      OB/GYN negative ob/gyn ROS         Other   arthritis,                        Anesthesia Plan    ASA 2     MAC   (Risks and benefits discussed including risk of aspiration, recall and dental damage. All patient questions answered. Will continue with POC.)  intravenous induction     Anesthetic plan, all risks, benefits, and alternatives have been provided, discussed and informed consent has been obtained with: patient.    Plan discussed with CRNA.

## 2022-01-05 LAB
LAB AP CASE REPORT: NORMAL
PATH REPORT.FINAL DX SPEC: NORMAL

## 2022-01-10 NOTE — PROGRESS NOTES
Subjective   Markell Chilel is a 61 y.o. male.   Chief Complaint   Patient presents with   • Follow-up     EGD     History of Present Illness   Patient is here for a follow up from his recent upper GI performed on 12/29/21. Patient states he is doing well and not having any problems.    The following portions of the patient's history were reviewed and updated as appropriate: allergies, current medications, past family history, past medical history, past social history, past surgical history and problem list.    Review of Systems   Constitutional: Negative for chills, fever and unexpected weight change.   HENT: Negative for trouble swallowing and voice change.    Eyes: Negative for visual disturbance.   Respiratory: Negative for apnea, cough, chest tightness, shortness of breath and wheezing.    Cardiovascular: Negative for chest pain, palpitations and leg swelling.   Gastrointestinal: Negative for abdominal distention, abdominal pain, anal bleeding, blood in stool, constipation, diarrhea, nausea, rectal pain and vomiting.   Endocrine: Negative for cold intolerance and heat intolerance.   Genitourinary: Negative for difficulty urinating, dysuria, flank pain, scrotal swelling and testicular pain.   Musculoskeletal: Negative for back pain, gait problem and joint swelling.   Skin: Negative for color change, rash and wound.   Neurological: Negative for dizziness, syncope, speech difficulty, weakness, numbness and headaches.   Hematological: Negative for adenopathy. Does not bruise/bleed easily.   Psychiatric/Behavioral: Negative for confusion. The patient is not nervous/anxious.        Objective   Physical Exam    Assessment/Plan   Diagnoses and all orders for this visit:    1. Gastroesophageal reflux disease without esophagitis (Primary)    Other orders  -     loratadine (CLARITIN) 10 MG tablet; Take 1 tablet by mouth Daily As Needed for Allergies.  Dispense: 30 tablet; Refill: 0  -     omeprazole (priLOSEC) 40 MG  capsule; Take 1 capsule by mouth Daily.  Dispense: 90 capsule; Refill: 3

## 2022-01-13 ENCOUNTER — OFFICE VISIT (OUTPATIENT)
Dept: SURGERY | Facility: CLINIC | Age: 62
End: 2022-01-13

## 2022-01-13 VITALS
RESPIRATION RATE: 18 BRPM | HEIGHT: 72 IN | OXYGEN SATURATION: 96 % | WEIGHT: 208.6 LBS | SYSTOLIC BLOOD PRESSURE: 140 MMHG | HEART RATE: 84 BPM | TEMPERATURE: 98.6 F | BODY MASS INDEX: 28.25 KG/M2 | DIASTOLIC BLOOD PRESSURE: 68 MMHG

## 2022-01-13 DIAGNOSIS — K21.9 GASTROESOPHAGEAL REFLUX DISEASE WITHOUT ESOPHAGITIS: Primary | ICD-10-CM

## 2022-01-13 PROCEDURE — 99213 OFFICE O/P EST LOW 20 MIN: CPT | Performed by: SURGERY

## 2022-01-13 RX ORDER — OMEPRAZOLE 40 MG/1
40 CAPSULE, DELAYED RELEASE ORAL DAILY
Qty: 90 CAPSULE | Refills: 3 | Status: SHIPPED | OUTPATIENT
Start: 2022-01-13 | End: 2023-01-24

## 2022-01-13 RX ORDER — LORATADINE 10 MG/1
10 TABLET ORAL DAILY PRN
Qty: 30 TABLET | Refills: 0
Start: 2022-01-13 | End: 2022-07-12 | Stop reason: HOSPADM

## 2022-07-11 ENCOUNTER — HOSPITAL ENCOUNTER (OUTPATIENT)
Facility: HOSPITAL | Age: 62
Setting detail: OBSERVATION
Discharge: HOME | End: 2022-07-12
Attending: EMERGENCY MEDICINE | Admitting: INTERNAL MEDICINE

## 2022-07-11 ENCOUNTER — APPOINTMENT (OUTPATIENT)
Dept: CARDIOLOGY | Facility: HOSPITAL | Age: 62
End: 2022-07-11

## 2022-07-11 ENCOUNTER — APPOINTMENT (OUTPATIENT)
Dept: GENERAL RADIOLOGY | Facility: HOSPITAL | Age: 62
End: 2022-07-11

## 2022-07-11 DIAGNOSIS — I48.91 ATRIAL FIBRILLATION WITH RAPID VENTRICULAR RESPONSE: ICD-10-CM

## 2022-07-11 DIAGNOSIS — I48.91 NEW ONSET ATRIAL FIBRILLATION: Primary | ICD-10-CM

## 2022-07-11 LAB
ALBUMIN SERPL-MCNC: 4.7 G/DL (ref 3.5–5.2)
ALBUMIN/GLOB SERPL: 2 G/DL
ALP SERPL-CCNC: 88 U/L (ref 39–117)
ALT SERPL W P-5'-P-CCNC: 28 U/L (ref 1–41)
AMPHET+METHAMPHET UR QL: NEGATIVE
AMPHETAMINES UR QL: NEGATIVE
ANION GAP SERPL CALCULATED.3IONS-SCNC: 18.5 MMOL/L (ref 5–15)
AST SERPL-CCNC: 33 U/L (ref 1–40)
BARBITURATES UR QL SCN: NEGATIVE
BASOPHILS # BLD AUTO: 0.03 10*3/MM3 (ref 0–0.2)
BASOPHILS NFR BLD AUTO: 0.7 % (ref 0–1.5)
BENZODIAZ UR QL SCN: NEGATIVE
BH CV ECHO MEAS - AO MAX PG: 8.4 MMHG
BH CV ECHO MEAS - AO MEAN PG: 4 MMHG
BH CV ECHO MEAS - AO ROOT DIAM: 2.46 CM
BH CV ECHO MEAS - AO V2 MAX: 145 CM/SEC
BH CV ECHO MEAS - AO V2 VTI: 23.9 CM
BH CV ECHO MEAS - AVA(I,D): 1.43 CM2
BH CV ECHO MEAS - EDV(CUBED): 125 ML
BH CV ECHO MEAS - EDV(MOD-SP2): 141 ML
BH CV ECHO MEAS - EDV(MOD-SP4): 130 ML
BH CV ECHO MEAS - EF(MOD-BP): 57.1 %
BH CV ECHO MEAS - EF(MOD-SP2): 61.1 %
BH CV ECHO MEAS - EF(MOD-SP4): 52.2 %
BH CV ECHO MEAS - ESV(CUBED): 31.3 ML
BH CV ECHO MEAS - ESV(MOD-SP2): 54.9 ML
BH CV ECHO MEAS - ESV(MOD-SP4): 62.1 ML
BH CV ECHO MEAS - FS: 37 %
BH CV ECHO MEAS - IVS/LVPW: 1.29 CM
BH CV ECHO MEAS - IVSD: 1.41 CM
BH CV ECHO MEAS - LA DIMENSION: 2.7 CM
BH CV ECHO MEAS - LAT PEAK E' VEL: 13.8 CM/SEC
BH CV ECHO MEAS - LV MASS(C)D: 247.6 GRAMS
BH CV ECHO MEAS - LV MAX PG: 8.6 MMHG
BH CV ECHO MEAS - LV MEAN PG: 5 MMHG
BH CV ECHO MEAS - LV V1 MAX: 147 CM/SEC
BH CV ECHO MEAS - LV V1 VTI: 23.8 CM
BH CV ECHO MEAS - LVIDD: 5 CM
BH CV ECHO MEAS - LVIDS: 3.2 CM
BH CV ECHO MEAS - LVOT AREA: 1.43 CM2
BH CV ECHO MEAS - LVOT DIAM: 1.35 CM
BH CV ECHO MEAS - LVPWD: 1.09 CM
BH CV ECHO MEAS - MED PEAK E' VEL: 10.2 CM/SEC
BH CV ECHO MEAS - MV DEC SLOPE: 765 CM/SEC2
BH CV ECHO MEAS - MV DEC TIME: 0.11 MSEC
BH CV ECHO MEAS - MV E MAX VEL: 102 CM/SEC
BH CV ECHO MEAS - MV MAX PG: 4.7 MMHG
BH CV ECHO MEAS - MV MEAN PG: 2 MMHG
BH CV ECHO MEAS - MV P1/2T: 42.5 MSEC
BH CV ECHO MEAS - MV V2 VTI: 21.6 CM
BH CV ECHO MEAS - MVA(P1/2T): 5.2 CM2
BH CV ECHO MEAS - MVA(VTI): 1.58 CM2
BH CV ECHO MEAS - PA ACC TIME: 0.13 SEC
BH CV ECHO MEAS - PA PR(ACCEL): 18.7 MMHG
BH CV ECHO MEAS - PA V2 MAX: 112 CM/SEC
BH CV ECHO MEAS - RAP SYSTOLE: 3 MMHG
BH CV ECHO MEAS - RV MAX PG: 3.1 MMHG
BH CV ECHO MEAS - RV V1 MAX: 87.9 CM/SEC
BH CV ECHO MEAS - RV V1 VTI: 18.6 CM
BH CV ECHO MEAS - SV(LVOT): 34.1 ML
BH CV ECHO MEAS - SV(MOD-SP2): 86.1 ML
BH CV ECHO MEAS - SV(MOD-SP4): 67.9 ML
BH CV ECHO MEAS - TAPSE (>1.6): 1.75 CM
BH CV ECHO MEASUREMENTS AVERAGE E/E' RATIO: 8.5
BH CV XLRA - RV BASE: 3.8 CM
BH CV XLRA - RV LENGTH: 10.2 CM
BH CV XLRA - RV MID: 2.44 CM
BILIRUB SERPL-MCNC: 0.6 MG/DL (ref 0–1.2)
BUN SERPL-MCNC: 19 MG/DL (ref 6–20)
BUN/CREAT SERPL: 21.6 (ref 7–25)
BUPRENORPHINE SERPL-MCNC: NEGATIVE NG/ML
CALCIUM SPEC-SCNC: 9.4 MG/DL (ref 8.6–10.5)
CANNABINOIDS SERPL QL: NEGATIVE
CHLORIDE SERPL-SCNC: 102 MMOL/L (ref 98–107)
CO2 SERPL-SCNC: 20.5 MMOL/L (ref 22–29)
COCAINE UR QL: NEGATIVE
CREAT SERPL-MCNC: 0.88 MG/DL (ref 0.76–1.27)
DEPRECATED RDW RBC AUTO: 40.2 FL (ref 37–54)
EGFRCR SERPLBLD CKD-EPI 2021: 99.1 ML/MIN/1.73
EOSINOPHIL # BLD AUTO: 0.08 10*3/MM3 (ref 0–0.4)
EOSINOPHIL NFR BLD AUTO: 2 % (ref 0.3–6.2)
ERYTHROCYTE [DISTWIDTH] IN BLOOD BY AUTOMATED COUNT: 12.1 % (ref 12.3–15.4)
GLOBULIN UR ELPH-MCNC: 2.3 GM/DL
GLUCOSE SERPL-MCNC: 166 MG/DL (ref 65–99)
HBA1C MFR BLD: 5.2 % (ref 4.8–5.6)
HCT VFR BLD AUTO: 49.9 % (ref 37.5–51)
HGB BLD-MCNC: 18 G/DL (ref 13–17.7)
HOLD SPECIMEN: NORMAL
HOLD SPECIMEN: NORMAL
IMM GRANULOCYTES # BLD AUTO: 0.09 10*3/MM3 (ref 0–0.05)
IMM GRANULOCYTES NFR BLD AUTO: 2.2 % (ref 0–0.5)
LEFT ATRIUM VOLUME INDEX: 30 ML/M2
LYMPHOCYTES # BLD AUTO: 1.84 10*3/MM3 (ref 0.7–3.1)
LYMPHOCYTES NFR BLD AUTO: 45.8 % (ref 19.6–45.3)
MAGNESIUM SERPL-MCNC: 1.8 MG/DL (ref 1.6–2.6)
MAXIMAL PREDICTED HEART RATE: 161 BPM
MCH RBC QN AUTO: 32.4 PG (ref 26.6–33)
MCHC RBC AUTO-ENTMCNC: 36.1 G/DL (ref 31.5–35.7)
MCV RBC AUTO: 89.9 FL (ref 79–97)
METHADONE UR QL SCN: NEGATIVE
MONOCYTES # BLD AUTO: 0.08 10*3/MM3 (ref 0.1–0.9)
MONOCYTES NFR BLD AUTO: 2 % (ref 5–12)
NEUTROPHILS NFR BLD AUTO: 1.9 10*3/MM3 (ref 1.7–7)
NEUTROPHILS NFR BLD AUTO: 47.3 % (ref 42.7–76)
NRBC BLD AUTO-RTO: 0 /100 WBC (ref 0–0.2)
OPIATES UR QL: POSITIVE
OXYCODONE UR QL SCN: NEGATIVE
PCP UR QL SCN: NEGATIVE
PLATELET # BLD AUTO: 142 10*3/MM3 (ref 140–450)
PMV BLD AUTO: 9.4 FL (ref 6–12)
POTASSIUM SERPL-SCNC: 3.9 MMOL/L (ref 3.5–5.2)
PROPOXYPH UR QL: NEGATIVE
PROT SERPL-MCNC: 7 G/DL (ref 6–8.5)
RBC # BLD AUTO: 5.55 10*6/MM3 (ref 4.14–5.8)
SARS-COV-2 RNA PNL SPEC NAA+PROBE: NOT DETECTED
SODIUM SERPL-SCNC: 141 MMOL/L (ref 136–145)
STRESS TARGET HR: 137 BPM
TRICYCLICS UR QL SCN: NEGATIVE
TROPONIN T SERPL-MCNC: 0.01 NG/ML (ref 0–0.03)
TROPONIN T SERPL-MCNC: <0.01 NG/ML (ref 0–0.03)
TROPONIN T SERPL-MCNC: <0.01 NG/ML (ref 0–0.03)
TSH SERPL DL<=0.05 MIU/L-ACNC: 0.31 UIU/ML (ref 0.27–4.2)
WBC NRBC COR # BLD: 4.02 10*3/MM3 (ref 3.4–10.8)
WHOLE BLOOD HOLD COAG: NORMAL
WHOLE BLOOD HOLD SPECIMEN: NORMAL

## 2022-07-11 PROCEDURE — 99285 EMERGENCY DEPT VISIT HI MDM: CPT

## 2022-07-11 PROCEDURE — 96365 THER/PROPH/DIAG IV INF INIT: CPT

## 2022-07-11 PROCEDURE — 87635 SARS-COV-2 COVID-19 AMP PRB: CPT | Performed by: EMERGENCY MEDICINE

## 2022-07-11 PROCEDURE — 96366 THER/PROPH/DIAG IV INF ADDON: CPT

## 2022-07-11 PROCEDURE — 25010000002 ENOXAPARIN PER 10 MG: Performed by: NURSE PRACTITIONER

## 2022-07-11 PROCEDURE — G0378 HOSPITAL OBSERVATION PER HR: HCPCS

## 2022-07-11 PROCEDURE — 80050 GENERAL HEALTH PANEL: CPT | Performed by: EMERGENCY MEDICINE

## 2022-07-11 PROCEDURE — 83036 HEMOGLOBIN GLYCOSYLATED A1C: CPT | Performed by: NURSE PRACTITIONER

## 2022-07-11 PROCEDURE — 96376 TX/PRO/DX INJ SAME DRUG ADON: CPT

## 2022-07-11 PROCEDURE — 83735 ASSAY OF MAGNESIUM: CPT | Performed by: EMERGENCY MEDICINE

## 2022-07-11 PROCEDURE — C9803 HOPD COVID-19 SPEC COLLECT: HCPCS

## 2022-07-11 PROCEDURE — 84484 ASSAY OF TROPONIN QUANT: CPT | Performed by: NURSE PRACTITIONER

## 2022-07-11 PROCEDURE — 96375 TX/PRO/DX INJ NEW DRUG ADDON: CPT

## 2022-07-11 PROCEDURE — 93306 TTE W/DOPPLER COMPLETE: CPT | Performed by: INTERNAL MEDICINE

## 2022-07-11 PROCEDURE — 96367 TX/PROPH/DG ADDL SEQ IV INF: CPT

## 2022-07-11 PROCEDURE — 93306 TTE W/DOPPLER COMPLETE: CPT

## 2022-07-11 PROCEDURE — 25010000002 AMIODARONE IN DEXTROSE 5% 360-4.14 MG/200ML-% SOLUTION: Performed by: NURSE PRACTITIONER

## 2022-07-11 PROCEDURE — 94799 UNLISTED PULMONARY SVC/PX: CPT

## 2022-07-11 PROCEDURE — 99204 OFFICE O/P NEW MOD 45 MIN: CPT | Performed by: INTERNAL MEDICINE

## 2022-07-11 PROCEDURE — 96372 THER/PROPH/DIAG INJ SC/IM: CPT

## 2022-07-11 PROCEDURE — 71045 X-RAY EXAM CHEST 1 VIEW: CPT

## 2022-07-11 PROCEDURE — 84484 ASSAY OF TROPONIN QUANT: CPT | Performed by: EMERGENCY MEDICINE

## 2022-07-11 PROCEDURE — 25010000002 AMIODARONE IN DEXTROSE 5% 150-4.21 MG/100ML-% SOLUTION: Performed by: NURSE PRACTITIONER

## 2022-07-11 PROCEDURE — 99219 PR INITIAL OBSERVATION CARE/DAY 50 MINUTES: CPT | Performed by: NURSE PRACTITIONER

## 2022-07-11 PROCEDURE — 80306 DRUG TEST PRSMV INSTRMNT: CPT | Performed by: FAMILY MEDICINE

## 2022-07-11 RX ORDER — ENOXAPARIN SODIUM 100 MG/ML
1 INJECTION SUBCUTANEOUS EVERY 12 HOURS
Status: DISCONTINUED | OUTPATIENT
Start: 2022-07-11 | End: 2022-07-12 | Stop reason: HOSPADM

## 2022-07-11 RX ORDER — METOPROLOL TARTRATE 5 MG/5ML
5 INJECTION INTRAVENOUS ONCE
Status: COMPLETED | OUTPATIENT
Start: 2022-07-11 | End: 2022-07-11

## 2022-07-11 RX ORDER — DILTIAZEM HYDROCHLORIDE 5 MG/ML
20 INJECTION INTRAVENOUS ONCE
Status: DISCONTINUED | OUTPATIENT
Start: 2022-07-11 | End: 2022-07-11

## 2022-07-11 RX ORDER — NICOTINE 21 MG/24HR
1 PATCH, TRANSDERMAL 24 HOURS TRANSDERMAL
Status: DISCONTINUED | OUTPATIENT
Start: 2022-07-11 | End: 2022-07-12 | Stop reason: HOSPADM

## 2022-07-11 RX ORDER — ACETAMINOPHEN 325 MG/1
650 TABLET ORAL ONCE
Status: DISCONTINUED | OUTPATIENT
Start: 2022-07-11 | End: 2022-07-12 | Stop reason: HOSPADM

## 2022-07-11 RX ORDER — HYDROCODONE BITARTRATE AND ACETAMINOPHEN 7.5; 325 MG/1; MG/1
1 TABLET ORAL EVERY 8 HOURS PRN
COMMUNITY

## 2022-07-11 RX ORDER — HYDROCODONE BITARTRATE AND ACETAMINOPHEN 7.5; 325 MG/1; MG/1
1 TABLET ORAL EVERY 8 HOURS PRN
Status: DISCONTINUED | OUTPATIENT
Start: 2022-07-11 | End: 2022-07-12 | Stop reason: HOSPADM

## 2022-07-11 RX ORDER — ONDANSETRON 2 MG/ML
4 INJECTION INTRAMUSCULAR; INTRAVENOUS EVERY 6 HOURS PRN
Status: DISCONTINUED | OUTPATIENT
Start: 2022-07-11 | End: 2022-07-12 | Stop reason: HOSPADM

## 2022-07-11 RX ORDER — PANTOPRAZOLE SODIUM 40 MG/1
40 TABLET, DELAYED RELEASE ORAL EVERY MORNING
Refills: 3 | Status: DISCONTINUED | OUTPATIENT
Start: 2022-07-11 | End: 2022-07-12 | Stop reason: HOSPADM

## 2022-07-11 RX ORDER — ONDANSETRON 4 MG/1
4 TABLET, FILM COATED ORAL EVERY 6 HOURS PRN
Status: DISCONTINUED | OUTPATIENT
Start: 2022-07-11 | End: 2022-07-12 | Stop reason: HOSPADM

## 2022-07-11 RX ORDER — SODIUM CHLORIDE 0.9 % (FLUSH) 0.9 %
10 SYRINGE (ML) INJECTION AS NEEDED
Status: DISCONTINUED | OUTPATIENT
Start: 2022-07-11 | End: 2022-07-12 | Stop reason: HOSPADM

## 2022-07-11 RX ORDER — SODIUM CHLORIDE 0.9 % (FLUSH) 0.9 %
10 SYRINGE (ML) INJECTION EVERY 12 HOURS SCHEDULED
Status: DISCONTINUED | OUTPATIENT
Start: 2022-07-11 | End: 2022-07-12 | Stop reason: HOSPADM

## 2022-07-11 RX ORDER — METOPROLOL TARTRATE 5 MG/5ML
INJECTION INTRAVENOUS
Status: COMPLETED
Start: 2022-07-11 | End: 2022-07-11

## 2022-07-11 RX ORDER — METOPROLOL SUCCINATE 25 MG/1
25 TABLET, EXTENDED RELEASE ORAL
Status: DISCONTINUED | OUTPATIENT
Start: 2022-07-11 | End: 2022-07-11

## 2022-07-11 RX ORDER — SODIUM CHLORIDE 0.9 % (FLUSH) 0.9 %
10 SYRINGE (ML) INJECTION AS NEEDED
Status: CANCELLED | OUTPATIENT
Start: 2022-07-11

## 2022-07-11 RX ORDER — HYDROXYZINE PAMOATE 25 MG/1
25 CAPSULE ORAL 3 TIMES DAILY PRN
Status: DISCONTINUED | OUTPATIENT
Start: 2022-07-11 | End: 2022-07-12 | Stop reason: HOSPADM

## 2022-07-11 RX ADMIN — SODIUM CHLORIDE 5 MG/HR: 900 INJECTION, SOLUTION INTRAVENOUS at 07:59

## 2022-07-11 RX ADMIN — AMIODARONE HYDROCHLORIDE 1 MG/MIN: 1.8 INJECTION, SOLUTION INTRAVENOUS at 13:24

## 2022-07-11 RX ADMIN — ENOXAPARIN SODIUM 80 MG: 80 INJECTION SUBCUTANEOUS at 23:39

## 2022-07-11 RX ADMIN — AMIODARONE HYDROCHLORIDE 0.5 MG/MIN: 1.8 INJECTION, SOLUTION INTRAVENOUS at 19:25

## 2022-07-11 RX ADMIN — AMIODARONE HYDROCHLORIDE 150 MG: 1.5 INJECTION, SOLUTION INTRAVENOUS at 13:13

## 2022-07-11 RX ADMIN — HYDROXYZINE PAMOATE 25 MG: 25 CAPSULE ORAL at 20:58

## 2022-07-11 RX ADMIN — HYDROXYZINE PAMOATE 25 MG: 25 CAPSULE ORAL at 14:30

## 2022-07-11 RX ADMIN — METOPROLOL SUCCINATE 25 MG: 25 TABLET, EXTENDED RELEASE ORAL at 11:58

## 2022-07-11 RX ADMIN — ENOXAPARIN SODIUM 80 MG: 80 INJECTION SUBCUTANEOUS at 11:58

## 2022-07-11 RX ADMIN — METOPROLOL TARTRATE 5 MG: 1 INJECTION, SOLUTION INTRAVENOUS at 06:56

## 2022-07-11 RX ADMIN — METOPROLOL TARTRATE 5 MG: 5 INJECTION INTRAVENOUS at 06:56

## 2022-07-11 RX ADMIN — SODIUM CHLORIDE 5 MG/HR: 900 INJECTION, SOLUTION INTRAVENOUS at 12:04

## 2022-07-11 RX ADMIN — Medication 1 PATCH: at 11:58

## 2022-07-11 NOTE — ED PROVIDER NOTES
"Subjective   History of Present Illness    Chief Complaint: Chest pain palpitations  History of Present Illness: 61-year-old male presents with 20 minutes of above complaint.  No history of same.  Arrived at job site and onsite EMS transported patient none, found to be narrow complex tachycardia at a rate of 260.  Administered 150 mg of amiodarone.  Heart rate is now 130 and irregular.  Onset: See above timeline  Duration: Persistent  Exacerbating / Alleviating factors: None  Associated symptoms: None      Nurses Notes reviewed and agree, including vitals, allergies, social history and prior medical history.     REVIEW OF SYSTEMS: All systems reviewed and not pertinent unless noted.    Positive for: Chest pain and palpitations    Negative for: Fever cough hemoptysis syncope  Review of Systems    Past Medical History:   Diagnosis Date   • Anxiety    • Arthritis    • COVID-19 vaccine series completed     moderna   • GERD (gastroesophageal reflux disease)    • Hx of exercise stress test 2014    IN [place]-\"IT WAS NORMAL\"   • Rotator cuff syndrome     right   • Squamous cell cancer of skin of left temple    • Wears contact lenses        Allergies   Allergen Reactions   • Penicillins Hives       Past Surgical History:   Procedure Laterality Date   • COLONOSCOPY     • ENDOSCOPY     • ENDOSCOPY N/A 12/29/2021    Procedure: ESOPHAGOGASTRODUODENOSCOPY WITH BIOPSY;  Surgeon: Kamilla Bruner MD;  Location: [facility] ENDOSCOPY;  Service: Gastroenterology;  Laterality: N/A;   • SHOULDER ARTHROSCOPY Right 12/13/2018    Procedure: shoulder arthroscopy, right,  subacromial decompression, distal clavicle excision and acromioplasty, mini-open rotator cuff repair;  Surgeon: Camilo Miller MD;  Location: [facility] OR;  Service: Orthopedics   • SHOULDER SURGERY Left    • WIDE EXCISION HEAD/NECK LESION/MASS Left 5/24/2021    Procedure: WIDE EXCISION SQUAMOUS CELL CARCINOMA LEFT TEMPLE WITH FROZEN SECTION;  Surgeon: Kamilla Bruner, " "MD;  Location: [facility];  Service: General;  Laterality: Left;   • WISDOM TOOTH EXTRACTION         Family History   Problem Relation Age of Onset   • Hypertension Mother    • No Known Problems Father        Social History     Socioeconomic History   • Marital status: Single   Tobacco Use   • Smoking status: Current Every Day Smoker     Years: 30.00     Types: Cigars   • Smokeless tobacco: Never Used   • Tobacco comment: 3-4 PER DAY   Vaping Use   • Vaping Use: Never used   Substance and Sexual Activity   • Alcohol use: Yes     Alcohol/week: 14.0 standard drinks     Types: 14 Cans of beer per week     Comment: 14 beers weekly   • Drug use: No   • Sexual activity: Defer           Objective   Physical Exam  BP (!) 133/102   Pulse 105   Temp 98.6 °F (37 °C) (Oral)   Resp 22   Ht 182.9 cm (72\")   Wt 90.7 kg (200 lb)   SpO2 95%   BMI 27.12 kg/m²     CONSTITUTIONAL: Well developed, nontoxic 61-year-old  male,  in no acute distress.  VITAL SIGNS: per nursing, reviewed and noted  SKIN: exposed skin with no rashes, ulcerations or petechiae  EYES: Grossly EOMI, no icterus  ENT: Normal voice.  Patient maintained wearing a mask throughout patient encounter due to coronavirus pandemic  RESPIRATORY:  No increased work of breathing. No retractions.   CARDIOVASCULAR: Tachycardic with irregular rhythm, no murmurs.  Good Peripheral pulses. Good cap refill to extremities.   GI: Abdomen soft, nontender, normal bowel sounds. No hernia. No ascites.  MUSCULOSKELETAL:  No tenderness. Full ROM. Strength and tone grossly normal.  no spasms. no neck or back tenderness or spasm.   NEUROLOGIC: Alert, oriented x 3. No gross deficits. GCS 15.   PSYCH: appropriate affect.  : no bladder tenderness or distention, no CVA tenderness      Procedures       No attending physician procedures were performed on this patient.    ED Course      EKG on arrival interpreted by me reveals atrial fibrillation with RVR rate 129.  None with single " supraventricular beat    Rhythm strip prior to arrival reveals narrow complex tachycardia which appeared regular, differential included SVT and A. fib.  On arrival patient has rate 129 suggestive of A. fib.  We will add metoprolol, basic labs, patient be signed out to oncoming physician for final disposition.    Continued elevated rates, given cardizem bolus and drip. Rates improving. Discussed with Dr. Marrero for admission. Patient is agreeable.    45 minutes of critical care provided. This time excludes other billable procedures. Time does include preparation of documents, medical consultations, review of old records, and direct bedside care. Patient is at high risk for life-threatening deterioration due to atrial fibrillation with rapid ventricular rates.                                        MDM  Number of Diagnoses or Management Options  Critical Care  Total time providing critical care: 30-74 minutes      Final diagnoses:   New onset atrial fibrillation (HCC)   Atrial fibrillation with rapid ventricular response (HCC)        Bruce Falk MD  07/11/22 [id number]

## 2022-07-11 NOTE — CONSULTS
"     [facility] Cardiology Consult Note    Markell Chilel  1960  [id number]  22    Requesting Physician: No ref. provider found    Chief Complaint: Palpitations    History of Present Illness:   Mr. Markell Chilel is a 61 y.o. male who is being seen by Cardiology for evaluation of new onset atrial fibrillation with rapid ventricular rates.  The patient has a past medical history significant for GERD, chronic lower back pain with chronic opioid use, and chronic tobacco use with a 1 pack/day use.  He does not have any significant past cardiac history.  The patient reports he was in his usual state of health, until he began experiencing acute onset palpitations while at work.  He reports his palpitations as a \"fast heart rate.\"  With his palpitations, he reports shortness of breath, a dull substernal chest discomfort, and dizziness/lightheadedness.  He did not have a syncopal episode.  He subsequently called 911, and on EMS evaluation he was reported to be tachycardic with a heart rate up to 260 bpm.  Rhythm strips from that time are unavailable for review.  He was subsidy given a bolus of 150 anemia, and transported to the emergency department for further evaluation.    Upon arrival in the emergency department, the patient was tachycardic with a heart rate in the 130s.  An ECG subsequently revealed atrial fibrillation.  He was given metoprolol and subsequent started on a Cardizem drip in the emergency department with improved ventricular rate control.  Initial labs including a troponin and TSH were within normal limits.  With improvement in ventricular rate control, the patient's palpitations resolved.  Cardiology has been consulted for further recommendations.    Prior Cardiac Testin. Last Coronary Angio: None  2. Prior Stress Testing: None  3. Last Echo: Pending  4. Prior Holter Monitor: None    Review of Systems:   Review of Systems   Constitutional: Negative for activity " "change, appetite change, chills, diaphoresis, fatigue, fever, unexpected weight gain and unexpected weight loss.   Eyes: Negative for blurred vision.   Respiratory: Positive for chest tightness and shortness of breath. Negative for cough and wheezing.    Cardiovascular: Positive for palpitations. Negative for chest pain and leg swelling.   Gastrointestinal: Negative for abdominal pain, anal bleeding, blood in stool and GERD.   Endocrine: Negative for cold intolerance and heat intolerance.   Genitourinary: Negative for hematuria.   Neurological: Negative for dizziness, syncope, weakness and light-headedness.   Hematological: Does not bruise/bleed easily.   Psychiatric/Behavioral: Negative for depressed mood and stress. The patient is not nervous/anxious.        Past Medical History:   Past Medical History:   Diagnosis Date   • Anxiety    • Arthritis    • COVID-19 vaccine series completed     moderna   • GERD (gastroesophageal reflux disease)    • Hx of exercise stress test 2014    IN [place]-\"IT WAS NORMAL\"   • Panic attacks    • Rotator cuff syndrome     right   • Squamous cell cancer of skin of left temple    • Wears contact lenses        Past Surgical History:   Past Surgical History:   Procedure Laterality Date   • COLONOSCOPY     • ENDOSCOPY     • ENDOSCOPY N/A 12/29/2021    Procedure: ESOPHAGOGASTRODUODENOSCOPY WITH BIOPSY;  Surgeon: Kamilla Bruner MD;  Location: [facility] ENDOSCOPY;  Service: Gastroenterology;  Laterality: N/A;   • SHOULDER ARTHROSCOPY Right 12/13/2018    Procedure: shoulder arthroscopy, right,  subacromial decompression, distal clavicle excision and acromioplasty, mini-open rotator cuff repair;  Surgeon: Camilo Miller MD;  Location: [facility] OR;  Service: Orthopedics   • SHOULDER SURGERY Left    • WIDE EXCISION HEAD/NECK LESION/MASS Left 5/24/2021    Procedure: WIDE EXCISION SQUAMOUS CELL CARCINOMA LEFT TEMPLE WITH FROZEN SECTION;  Surgeon: Kamilla Bruner MD;  Location: [facility] OR;  " Service: General;  Laterality: Left;   • WISDOM TOOTH EXTRACTION         Family History:   Family History   Problem Relation Age of Onset   • Hypertension Mother    • No Known Problems Father        Social History:   Social History     Socioeconomic History   • Marital status: Single   Tobacco Use   • Smoking status: Current Every Day Smoker     Packs/day: 1.00     Years: 30.00     Pack years: 30.00     Types: Cigars   • Smokeless tobacco: Never Used   • Tobacco comment: 3-4 PER DAY   Vaping Use   • Vaping Use: Never used   Substance and Sexual Activity   • Alcohol use: Yes     Alcohol/week: 14.0 standard drinks     Types: 14 Cans of beer per week     Comment: 14 beers weekly   • Drug use: No   • Sexual activity: Defer       Medications:     Current Facility-Administered Medications:   •  amiodarone in dextrose 5% (NEXTERONE) loading dose 150mg/100mL, 150 mg, Intravenous, Once **FOLLOWED BY** amiodarone 360 mg in 200 mL D5W infusion, 1 mg/min, Intravenous, Continuous **FOLLOWED BY** amiodarone 360 mg in 200 mL D5W infusion, 0.5 mg/min, Intravenous, Continuous, Rosi Mccullough, APRN  •  Enoxaparin Sodium (LOVENOX) syringe 80 mg, 1 mg/kg, Subcutaneous, Q12H, JmaelRosi calabrese, APRN, 80 mg at 07/11/22 1158  •  HYDROcodone-acetaminophen (NORCO) 7.5-325 MG per tablet 1 tablet, 1 tablet, Oral, Q8H PRN, Jamel, Rosi J, APRN  •  hydrOXYzine pamoate (VISTARIL) capsule 25 mg, 25 mg, Oral, TID PRN, JamelRosi calabrese, APRN  •  nicotine (NICODERM CQ) 21 MG/24HR patch 1 patch, 1 patch, Transdermal, Q24H, Suzette Marrero, DO, 1 patch at 07/11/22 1158  •  ondansetron (ZOFRAN) tablet 4 mg, 4 mg, Oral, Q6H PRN **OR** ondansetron (ZOFRAN) injection 4 mg, 4 mg, Intravenous, Q6H PRN, Jamel, Rosi J, APRN  •  pantoprazole (PROTONIX) EC tablet 40 mg, 40 mg, Oral, QAM, Jamel, Rosi CEVALLOS, CRYSTAL  •  Pharmacy to Dose enoxaparin (LOVENOX), , Does not apply, Continuous Jamel FRANCES Rebecca J, APRN  •  sodium chloride 0.9 % flush 10 mL,  10 mL, Intravenous, PRN, Jay Mcgill, DO  •  sodium chloride 0.9 % flush 10 mL, 10 mL, Intravenous, Q12H, Rosi Mccullough APRN  •  sodium chloride 0.9 % flush 10 mL, 10 mL, Intravenous, PRN, Rosi Mccullough APRN    Allergies:   Allergies   Allergen Reactions   • Penicillins Hives       Physical Exam:  Vital Signs:   Vitals:    07/11/22 1115 07/11/22 1130 07/11/22 1145 07/11/22 1200   BP: 132/66 122/90 128/82 114/85   BP Location:       Patient Position:       Pulse: 104 101 97 103   Resp:       Temp:       TempSrc:       SpO2: 97% 95% 96% 94%   Weight:       Height:           Physical Exam  Vitals and nursing note reviewed.   Constitutional:       General: He is not in acute distress.     Appearance: Normal appearance. He is not diaphoretic.   HENT:      Head: Normocephalic and atraumatic.   Cardiovascular:      Rate and Rhythm: Normal rate. Rhythm irregular.      Heart sounds: No murmur heard.  Pulmonary:      Effort: Pulmonary effort is normal. No respiratory distress.      Breath sounds: Normal breath sounds. No stridor. No wheezing, rhonchi or rales.   Abdominal:      General: Bowel sounds are normal. There is no distension.      Palpations: Abdomen is soft.      Tenderness: There is no abdominal tenderness. There is no guarding or rebound.   Musculoskeletal:         General: No swelling. Normal range of motion.   Skin:     General: Skin is warm and dry.   Neurological:      General: No focal deficit present.      Mental Status: He is alert and oriented to person, place, and time.   Psychiatric:         Mood and Affect: Mood normal.         Behavior: Behavior normal.         Results Review:   Results from last 7 days   Lab Units 07/11/22  0655   SODIUM mmol/L 141   POTASSIUM mmol/L 3.9   CHLORIDE mmol/L 102   CO2 mmol/L 20.5*   BUN mg/dL 19   CREATININE mg/dL 0.88   CALCIUM mg/dL 9.4   BILIRUBIN mg/dL 0.6   ALK PHOS U/L 88   ALT (SGPT) U/L 28   AST (SGOT) U/L 33   GLUCOSE mg/dL 166*     Results from last  7 days   Lab Units 07/11/22  0655   TROPONIN T ng/mL <0.010     Results from last 7 days   Lab Units 07/11/22  0655   WBC 10*3/mm3 4.02   HEMOGLOBIN g/dL 18.0*   HEMATOCRIT % 49.9   PLATELETS 10*3/mm3 142         Results from last 7 days   Lab Units 07/11/22  0655   MAGNESIUM mg/dL 1.8           I personally viewed and interpreted the patient's EKG/Telemetry data     Assessment / Plan:     1.  Atrial fibrillation with rapid ventricular rates  -- Appears to be new onset, with onset of symptoms shortly prior to presentation to the emergency department  -- Ventricular rate control improved with diltiazem drip  -- Given new onset within 48 hours, will discontinue diltiazem and start IV amiodarone to attempt chemical cardioversion  -- If the patient does not chemically cardiovert by tomorrow morning, will proceed with synchronized cardioversion  -- Once sinus rhythm restored, will plan to start antiarrhythmic therapy (potentially flecainide) upon discharge  -- [id number] is currently 0 (pending results of echocardiogram), defer anticoagulation at this time however electrical cardioversion is required weill then need 1 month of anticoagulation       Thank you for allowing me to participate in the care of your patient. Please do not hesitate to contact me with additional questions or concerns.     LB Real MD  Interventional Cardiology   07/11/22  12:58 EDT

## 2022-07-11 NOTE — PLAN OF CARE
Goal Outcome Evaluation:  Plan of Care Reviewed With: patient, significant other           Outcome Evaluation: Pt changed from Cardizem to amio drip. afib, HR 70-100s.   Pt said he's claustrophobic and a bit restless bc he's constantly working or doing something.  Sitting up in chair at this time

## 2022-07-11 NOTE — CASE MANAGEMENT/SOCIAL WORK
Discharge Planning Assessment  [facility]     Patient Name: Markell Chilel  MRN: [id number]  Today's Date: 7/11/2022    Admit Date: 7/11/2022     Discharge Needs Assessment     Row Name 07/11/22 1630       Living Environment    People in Home significant other    Current Living Arrangements home    Primary Care Provided by self    Provides Primary Care For no one    Family Caregiver if Needed significant other    Quality of Family Relationships supportive    Able to Return to Prior Arrangements yes    Living Arrangement Comments plans home on d/c; SO will transport       Resource/Environmental Concerns    Resource/Environmental Concerns none    Transportation Concerns none       Transition Planning    Patient/Family Anticipates Transition to home with family    Patient/Family Anticipated Services at Transition none    Transportation Anticipated family or friend will provide       Discharge Needs Assessment    Readmission Within the Last 30 Days no previous admission in last 30 days    Equipment Currently Used at Home none    Concerns to be Addressed discharge planning    Anticipated Changes Related to Illness none    Equipment Needed After Discharge none    Provided Post Acute Provider List? N/A    Provided Post Acute Provider Quality & Resource List? N/A               Discharge Plan     Row Name 07/11/22 1634       Plan    Plan pt resting in chair; plans to go home on d/c; SO will transport; pt still works full time as ; no lw/poa and no info wanted; zero issues with money related to paying bills, food, and has his own auto to go to appointments; agreed to meds to beds; cm info card given and explained; cm will continue to follow              Continued Care and Services - Admitted Since 7/11/2022    Coordination has not been started for this encounter.          Demographic Summary     Row Name 07/11/22 1629       General Information    Admission Type observation    Arrived From emergency department     Referral Source admission list    Reason for Consult discharge planning    Preferred Language English       Contact Information    Permission Granted to Share Info With family/designee  significant other Flaquita Wilkerson               Functional Status     Row Name 07/11/22 2900       Functional Status    Usual Activity Tolerance moderate    Current Activity Tolerance moderate       Functional Status, IADL    Medications independent    Meal Preparation independent    Housekeeping independent    Laundry independent    Shopping independent       Mental Status    General Appearance WDL WDL       Mental Status Summary    Recent Changes in Mental Status/Cognitive Functioning no changes       Employment/    Employment Status employed full-time    Current or Previous Occupation construction    Employment/ Comments leah Haddad RN

## 2022-07-11 NOTE — H&P
[facility]      Name:  Markell Chilel   Age:  61 y.o.  Sex:  male  :  1960  MRN:  [id number]   Visit Number:  [id number]  Admission Date:  2022  Date Of Service:  22  Primary Care Physician:  Charlotte Chan APRN    Chief Complaint:     Chest pain/Palpitations    History Of Presenting Illness:      61-year-old male presented via EMS to the emergency department complaining of chest pain and palpitations.  Pertinent past medical history includes tobacco abuse, GERD, and  chronic low back pain with opioid dependence.  Patient presented to 5 systems appointment this morning when he began suddenly having palpitations and shortness of air, chest pain, and near syncope.  Patient quickly informed his coworkers of how he was feeling for which they then called 911.  Per EMS patient heart rate noted to be 260 for which 150 mg of amiodarone was administered.  Patient denied associated recent illness, new medications, fever, or other concerns.  Of note patient states that his father is  from MI at the age of 52.  Patient currently lives with his wife who also states that he snores.  He does not currently have a PCP.    Upon ED presentation patient noted to have heart rate of 130 with atrial fibrillation noted on telemetry.  Patient was given 5 mg IV push of metoprolol.  He was initiated on Cardizem drip.  Pertinent laboratory findings include troponin within normal limits, creatinine 0.8, A1c 5.2, TSH 0.3, magnesium 1.8, chest x-ray within normal limits, and COVID-19 testing negative.  Echo currently pending.  Hospitalist consulted for further medical management.    Review Of Systems:    All systems were reviewed and negative except as mentioned in history of presenting illness, assessment and plan.    Past Medical History: Patient  has a past medical history of Anxiety, Arthritis, COVID-19 vaccine series completed, GERD (gastroesophageal reflux  disease), exercise stress test (2014), Panic attacks, Rotator cuff syndrome, Squamous cell cancer of skin of left temple, and Wears contact lenses.    Past Surgical History: Patient  has a past surgical history that includes Shoulder surgery (Left); Colonoscopy; Esophagogastroduodenoscopy; [place] tooth extraction; Shoulder arthroscopy (Right, 12/13/2018); Head/Neck Lesion/Mass Excision (Left, 5/24/2021); and Esophagogastroduodenoscopy (N/A, 12/29/2021).    Social History: Patient  reports that he has been smoking cigars. He has a 30.00 pack-year smoking history. He has never used smokeless tobacco. He reports current alcohol use of about 14.0 standard drinks of alcohol per week. He reports that he does not use drugs.    Family History: Patient's family history includes Hypertension in his mother; No Known Problems in his father.    Allergies:      Penicillins    Home Medications:    Prior to Admission Medications     Prescriptions Last Dose Informant Patient Reported? Taking?    HYDROcodone-acetaminophen (NORCO) 7.5-325 MG per tablet 7/11/2022 Self Yes Yes    Take 1 tablet by mouth Every 8 (Eight) Hours As Needed for Moderate Pain .    loratadine (CLARITIN) 10 MG tablet 7/11/2022  No Yes    Take 1 tablet by mouth Daily As Needed for Allergies.    omeprazole (priLOSEC) 40 MG capsule 7/11/2022  No Yes    Take 1 capsule by mouth Daily.        ED Medications:    Medications   sodium chloride 0.9 % flush 10 mL (has no administration in time range)   dilTIAZem (CARDIZEM) 100 mg in 100 mL NS infusion (ADV) (10 mg/hr Intravenous Rate/Dose Change 7/11/22 6075)   nicotine (NICODERM CQ) 21 MG/24HR patch 1 patch (has no administration in time range)   metoprolol tartrate (LOPRESSOR) injection 5 mg (5 mg Intravenous Given 7/11/22 0656)   dilTIAZem (CARDIZEM) bolus from bag 1 mg/mL 20 mg (20 mg Intravenous Bolus from Bag 7/11/22 6643)     Vital Signs:  Temp:  [98.6 °F (37 °C)] 98.6 °F (37 °C)  Heart Rate:  [] 107  Resp:   "[20-22] 20  BP: (115-154)/() 115/73        07/11/22  0652 07/11/22  1045   Weight: 90.7 kg (200 lb) 81.1 kg (178 lb 12.7 oz)     Body mass index is 24.25 kg/m².    Physical Exam:     Most recent vital Signs: /73 (BP Location: Left arm, Patient Position: Lying)   Pulse 107   Temp 98.6 °F (37 °C) (Oral)   Resp 20   Ht 182.9 cm (72\")   Wt 81.1 kg (178 lb 12.7 oz)   SpO2 96%   BMI 24.25 kg/m²     Physical Exam  Constitutional:       Appearance: Normal appearance.      Comments: anxious   HENT:      Head: Normocephalic.      Nose: Nose normal.      Mouth/Throat:      Mouth: Mucous membranes are moist.   Eyes:      Pupils: Pupils are equal, round, and reactive to light.   Cardiovascular:      Rate and Rhythm: Tachycardia present. Rhythm irregular.      Pulses: Normal pulses.      Comments:   Pulmonary:      Effort: Pulmonary effort is normal.      Breath sounds: Normal breath sounds.   Abdominal:      General: Abdomen is flat. Bowel sounds are normal.      Palpations: Abdomen is soft.   Musculoskeletal:         General: Normal range of motion.      Cervical back: Normal range of motion.   Skin:     General: Skin is warm.      Capillary Refill: Capillary refill takes less than 2 seconds.   Neurological:      General: No focal deficit present.      Mental Status: He is alert.   Psychiatric:         Mood and Affect: Mood normal.         Laboratory data:    I have reviewed the labs done in the emergency room.    Results from last 7 days   Lab Units 07/11/22  0655   SODIUM mmol/L 141   POTASSIUM mmol/L 3.9   CHLORIDE mmol/L 102   CO2 mmol/L 20.5*   BUN mg/dL 19   CREATININE mg/dL 0.88   CALCIUM mg/dL 9.4   BILIRUBIN mg/dL 0.6   ALK PHOS U/L 88   ALT (SGPT) U/L 28   AST (SGOT) U/L 33   GLUCOSE mg/dL 166*     Results from last 7 days   Lab Units 07/11/22  0655   WBC 10*3/mm3 4.02   HEMOGLOBIN g/dL 18.0*   HEMATOCRIT % 49.9   PLATELETS 10*3/mm3 142         Results from last 7 days   Lab Units " 07/11/22  0655   TROPONIN T ng/mL <0.010                           Invalid input(s): USDES,  BLOODU, NITRITITE, BACT, EP    Pain Management Panel    There is no flowsheet data to display.         EKG:      Atrial Fibrillation with RVR with rate 109    Radiology:    XR Chest 1 View    Result Date: 7/11/2022  PROCEDURE: XR CHEST 1 VW-  HISTORY: Chest pain protocol  COMPARISON:  04/04/2021  FINDINGS:  Portable view of the chest demonstrates the lungs to be grossly clear. There is no evidence of effusion, pneumothorax or other significant pleural disease. The mediastinum is unremarkable.  The heart size is normal.      Unremarkable portable chest.  This report was signed and finalized on 7/11/2022 7:41 AM by Ab Meeks MD.      Assessment:    1. New onset atrial fibrillation with RVR, POA  2. GERD  3. Chronic opioid dependence  4. Tobacco abuse    Plan:    New onset atrial fibrillation with RVR, POA  - Cardiology Consult Dr. Real following for possible cardioversion in am- appreciate recommendations  - Will switch over to Amio gtt  - NPO after midnight  - Would advise outpatient sleep study  - Therapeutic Lovenox  - Continue to trend Troponin  - TSH normal    GERD  Chronic opioid dependence  Tobacco abuse  - Continue home medications  - Tobacco cessation strongly encouraged    Risk Assessment: High  DVT Prophylaxis: Therapeutic Lovenox  Code Status: Full code  Diet: Cardiac    Advance Care Planning   ACP discussion was declined by the patient. Patient does not have an advance directive, declines further assistance.           Rosi Mccullough, CRYSTAL  07/11/22  11:24 EDT    Dictated utilizing Dragon dictation.

## 2022-07-12 VITALS
BODY MASS INDEX: 24.11 KG/M2 | SYSTOLIC BLOOD PRESSURE: 141 MMHG | RESPIRATION RATE: 18 BRPM | TEMPERATURE: 97.4 F | WEIGHT: 178 LBS | HEART RATE: 81 BPM | HEIGHT: 72 IN | OXYGEN SATURATION: 97 % | DIASTOLIC BLOOD PRESSURE: 97 MMHG

## 2022-07-12 LAB
ANION GAP SERPL CALCULATED.3IONS-SCNC: 13.1 MMOL/L (ref 5–15)
BUN SERPL-MCNC: 17 MG/DL (ref 6–20)
BUN/CREAT SERPL: 21.8 (ref 7–25)
CALCIUM SPEC-SCNC: 9.2 MG/DL (ref 8.6–10.5)
CHLORIDE SERPL-SCNC: 103 MMOL/L (ref 98–107)
CHOLEST SERPL-MCNC: 181 MG/DL (ref 0–200)
CO2 SERPL-SCNC: 21.9 MMOL/L (ref 22–29)
CREAT SERPL-MCNC: 0.78 MG/DL (ref 0.76–1.27)
EGFRCR SERPLBLD CKD-EPI 2021: 102.7 ML/MIN/1.73
GLUCOSE SERPL-MCNC: 143 MG/DL (ref 65–99)
HDLC SERPL-MCNC: 54 MG/DL (ref 40–60)
LDLC SERPL CALC-MCNC: 105 MG/DL (ref 0–100)
LDLC/HDLC SERPL: 1.88 {RATIO}
POTASSIUM SERPL-SCNC: 3.7 MMOL/L (ref 3.5–5.2)
SODIUM SERPL-SCNC: 138 MMOL/L (ref 136–145)
TRIGL SERPL-MCNC: 127 MG/DL (ref 0–150)
VLDLC SERPL-MCNC: 22 MG/DL (ref 5–40)

## 2022-07-12 PROCEDURE — 93005 ELECTROCARDIOGRAM TRACING: CPT | Performed by: NURSE PRACTITIONER

## 2022-07-12 PROCEDURE — 80048 BASIC METABOLIC PNL TOTAL CA: CPT | Performed by: NURSE PRACTITIONER

## 2022-07-12 PROCEDURE — 99217 PR OBSERVATION CARE DISCHARGE MANAGEMENT: CPT | Performed by: NURSE PRACTITIONER

## 2022-07-12 PROCEDURE — 96366 THER/PROPH/DIAG IV INF ADDON: CPT

## 2022-07-12 PROCEDURE — 99214 OFFICE O/P EST MOD 30 MIN: CPT | Performed by: INTERNAL MEDICINE

## 2022-07-12 PROCEDURE — G0378 HOSPITAL OBSERVATION PER HR: HCPCS

## 2022-07-12 PROCEDURE — 25010000002 AMIODARONE IN DEXTROSE 5% 360-4.14 MG/200ML-% SOLUTION: Performed by: NURSE PRACTITIONER

## 2022-07-12 PROCEDURE — 80061 LIPID PANEL: CPT | Performed by: NURSE PRACTITIONER

## 2022-07-12 RX ORDER — METOPROLOL SUCCINATE 25 MG/1
25 TABLET, EXTENDED RELEASE ORAL
Qty: 30 TABLET | Refills: 0 | Status: SHIPPED | OUTPATIENT
Start: 2022-07-12 | End: 2022-08-11 | Stop reason: SDUPTHER

## 2022-07-12 RX ORDER — FLECAINIDE ACETATE 50 MG/1
50 TABLET ORAL EVERY 12 HOURS SCHEDULED
Qty: 60 TABLET | Refills: 0 | Status: SHIPPED | OUTPATIENT
Start: 2022-07-13 | End: 2022-08-11 | Stop reason: SDUPTHER

## 2022-07-12 RX ORDER — METOPROLOL SUCCINATE 25 MG/1
25 TABLET, EXTENDED RELEASE ORAL
Status: DISCONTINUED | OUTPATIENT
Start: 2022-07-12 | End: 2022-07-12 | Stop reason: HOSPADM

## 2022-07-12 RX ORDER — FLECAINIDE ACETATE 50 MG/1
50 TABLET ORAL EVERY 12 HOURS SCHEDULED
Status: DISCONTINUED | OUTPATIENT
Start: 2022-07-13 | End: 2022-07-12 | Stop reason: HOSPADM

## 2022-07-12 RX ORDER — NICOTINE 21 MG/24HR
1 PATCH, TRANSDERMAL 24 HOURS TRANSDERMAL
Qty: 28 PATCH | Refills: 0 | Status: SHIPPED | OUTPATIENT
Start: 2022-07-13 | End: 2022-11-11

## 2022-07-12 RX ADMIN — Medication 1 PATCH: at 08:21

## 2022-07-12 RX ADMIN — AMIODARONE HYDROCHLORIDE 0.5 MG/MIN: 1.8 INJECTION, SOLUTION INTRAVENOUS at 08:33

## 2022-07-12 RX ADMIN — METOPROLOL SUCCINATE 25 MG: 25 TABLET, EXTENDED RELEASE ORAL at 10:16

## 2022-07-12 NOTE — DISCHARGE INSTRUCTIONS
Start Flecanide 50 mg PO twice daily on Wed. 7/13  Please come to the Cardiology Clinic on Friday 7/15 for an EKG  Start Metoprolol  No anticoagulation needed  Return to ED if worsening symptoms

## 2022-07-12 NOTE — CASE MANAGEMENT/SOCIAL WORK
Case Management Discharge Note           Provided Post Acute Provider List?: N/A  Provided Post Acute Provider Quality & Resource List?: N/A    Selected Continued Care - Discharged on 7/12/2022 Admission date: 7/11/2022 - Discharge disposition: Home or Self Care     Transportation Services  Private: Car    Final Discharge Disposition Code: 01 - home or self-care

## 2022-07-12 NOTE — PROGRESS NOTES
[facility] Cardiology IP Progress Note    Markell Chilel  1960  [id number]  07/12/22    Subjective:   Mr. Markell Chilel is a 61 y.o. male seen in follow-up for paroxysmal atrial fibrillation with rapid ventricular rates.  No acute overnight events.  Converted to sinus rhythm overnight.  This morning, the patient reports resolution of his palpitations.  Reports he currently feels well.  No chest pain or dyspnea.  Anticipating discharge home today.    Review of Systems:   Review of Systems   Constitutional: Negative for activity change, appetite change, chills, diaphoresis, fatigue, fever, unexpected weight gain and unexpected weight loss.   Respiratory: Negative for cough, chest tightness, shortness of breath and wheezing.    Cardiovascular: Negative for chest pain, palpitations and leg swelling.   Gastrointestinal: Negative for abdominal pain, anal bleeding, blood in stool and GERD.   Neurological: Negative for dizziness, syncope, weakness and light-headedness.   Hematological: Does not bruise/bleed easily.   Psychiatric/Behavioral: Negative for depressed mood and stress. The patient is not nervous/anxious.        I have reviewed and/or updated the patient's past medical, past surgical, family history, social history, problem list and allergies as appropriate in the chart.     Physical Exam:  Vital Signs:   Vitals:    07/11/22 1924 07/11/22 2336 07/12/22 0334 07/12/22 0700   BP: 146/84 153/93 146/86 141/97   BP Location: Right arm Left arm Left arm Left arm   Patient Position: Sitting Lying Lying Sitting   Pulse: 72 68 80 81   Resp: 20 20 18 18   Temp: 98 °F (36.7 °C) 97.5 °F (36.4 °C) 97.8 °F (36.6 °C) 97.4 °F (36.3 °C)   TempSrc: Oral Oral Oral Oral   SpO2: 99% 97% 96% 97%   Weight:       Height:           Physical Exam  Vitals reviewed.   Constitutional:       General: He is not in acute distress.     Appearance: Normal appearance. He is not diaphoretic.   HENT:      Head:  Normocephalic and atraumatic.   Cardiovascular:      Rate and Rhythm: Normal rate and regular rhythm.      Heart sounds: No murmur heard.  Pulmonary:      Effort: Pulmonary effort is normal. No respiratory distress.      Breath sounds: Normal breath sounds. No stridor. No wheezing, rhonchi or rales.   Abdominal:      General: Bowel sounds are normal. There is no distension.      Palpations: Abdomen is soft.      Tenderness: There is no abdominal tenderness. There is no guarding or rebound.   Musculoskeletal:         General: No swelling. Normal range of motion.      Cervical back: Neck supple. No tenderness.   Skin:     General: Skin is warm and dry.   Neurological:      General: No focal deficit present.      Mental Status: He is alert and oriented to person, place, and time.   Psychiatric:         Mood and Affect: Mood normal.         Behavior: Behavior normal.         Results Review:   Results from last 7 days   Lab Units 07/12/22  0532 07/11/22  0655   SODIUM mmol/L 138 141   POTASSIUM mmol/L 3.7 3.9   CHLORIDE mmol/L 103 102   CO2 mmol/L 21.9* 20.5*   BUN mg/dL 17 19   CREATININE mg/dL 0.78 0.88   CALCIUM mg/dL 9.2 9.4   BILIRUBIN mg/dL  --  0.6   ALK PHOS U/L  --  88   ALT (SGPT) U/L  --  28   AST (SGOT) U/L  --  33   GLUCOSE mg/dL 143* 166*     Results from last 7 days   Lab Units 07/11/22  1506 07/11/22  1233 07/11/22  0655   TROPONIN T ng/mL <0.010 0.013 <0.010     Results from last 7 days   Lab Units 07/11/22  0655   WBC 10*3/mm3 4.02   HEMOGLOBIN g/dL 18.0*   HEMATOCRIT % 49.9   PLATELETS 10*3/mm3 142         Results from last 7 days   Lab Units 07/11/22  0655   MAGNESIUM mg/dL 1.8     Results from last 7 days   Lab Units 07/12/22  0532   CHOLESTEROL mg/dL 181   TRIGLYCERIDES mg/dL 127   HDL CHOL mg/dL 54   LDL CHOL mg/dL 105*       I personally viewed and interpreted the patient's EKG/Telemetry data     Medications:   )  Current Facility-Administered Medications:   •  acetaminophen (TYLENOL) tablet 650  mg, 650 mg, Oral, Once, JamelRosi calabrese, APRN  •  Enoxaparin Sodium (LOVENOX) syringe 80 mg, 1 mg/kg, Subcutaneous, Q12H, Rosi Mccullough APRN, 80 mg at 07/11/22 1719  •  [START ON 7/13/2022] flecainide (TAMBOCOR) tablet 50 mg, 50 mg, Oral, Q12H, Christian Real MD  •  HYDROcodone-acetaminophen (NORCO) 7.5-325 MG per tablet 1 tablet, 1 tablet, Oral, Q8H PRN, JamelRosi calabrese APRN  •  hydrOXYzine pamoate (VISTARIL) capsule 25 mg, 25 mg, Oral, TID PRN, Rosi Mccullough APRN, 25 mg at 07/11/22 2058  •  metoprolol succinate XL (TOPROL-XL) 24 hr tablet 25 mg, 25 mg, Oral, Q24H, Christian Real MD  •  nicotine (NICODERM CQ) 21 MG/24HR patch 1 patch, 1 patch, Transdermal, Q24H, Suzette Marrero DO, 1 patch at 07/12/22 0821  •  ondansetron (ZOFRAN) tablet 4 mg, 4 mg, Oral, Q6H PRN **OR** ondansetron (ZOFRAN) injection 4 mg, 4 mg, Intravenous, Q6H PRN, JamelMolly calabreseecca MART, APRN  •  pantoprazole (PROTONIX) EC tablet 40 mg, 40 mg, Oral, QAM, JamelRosi calabrese, APRN  •  Pharmacy to Dose enoxaparin (LOVENOX), , Does not apply, Continuous PRN, Jamel, Rosi J, APRN  •  sodium chloride 0.9 % flush 10 mL, 10 mL, Intravenous, PRN, Jay Mcgill DO  •  sodium chloride 0.9 % flush 10 mL, 10 mL, Intravenous, Q12H, Jamel, Rosi MART, APRN  •  sodium chloride 0.9 % flush 10 mL, 10 mL, Intravenous, PRN, Jamel, Rosi J, APRN    Assessment / Plan:     1.  Atrial fibrillation with rapid ventricular rates  -- Presented with new onset atrial fibrillation with rapid ventricular rates  -- Conversion to sinus rhythm overnight with IV amiodarone drip, will discontinue  -- Echocardiogram completed, normal LV systolic function  -- Start metoprolol XL for rate control  -- Will start flecainide 50 mg p.o. twice daily with first dose tomorrow morning for rhythm control strategy  -- [id number] is currently 0, coagulation discussed with the patient and will defer at this time given low [id number]  -- If recurrent PAF/RVR despite up  titration of flecainide, would then consider referral for ablation  -- The patient was instructed to present to the cardiology clinic on Friday for an ECG to monitor QTc interval after initiation of flecainide  -- Follow-up in the cardiology clinic in 2 weeks for further management        Thank you for allowing me to participate in the care of your patient. Please to not hesitate to contact me with additional questions or concerns.     LB Real MD  Interventional Cardiology   07/12/22  09:43 EDT

## 2022-07-12 NOTE — DISCHARGE SUMMARY
[facility]   DISCHARGE SUMMARY      Name:  Markell Chilel   Age:  61 y.o.  Sex:  male  :  1960  MRN:  [id number]   Visit Number:  [id number]    Admission Date:  2022  Date of Discharge:  2022  Primary Care Physician:  Charlotte Chan APRN    Important issues to note:    - Presented to ED with 's with Atrial Fibrillation noted  - Given Metoprolol/Cardizem gtt/Amiodarone gtt- Converted back to Sinus Rhythm without intervention needed.  - Discharged home with Cardiology follow up  - Discharged home with Flecainide/Metoprolol  - No need for anticoagulation given Chads score 0    Discharge Diagnoses:     1. New onset atrial fibrillation with RVR, POA  2. GERD  3. Chronic opioid dependence  4. Tobacco abuse      Problem List:     Active Hospital Problems    Diagnosis  POA   • **New onset atrial fibrillation (HCC) [I48.91]  Yes      Resolved Hospital Problems   No resolved problems to display.     Presenting Problem:    Chief Complaint   Patient presents with   • Irregular Heart Beat      Consults:     Consulting Physician(s)  Chat With All Active Members    Provider Relationship Specialty    Christian Real MD  Consulting Physician Cardiology        Procedures Performed:        History of presenting illness/Hospital Course:    61-year-old male presented via EMS to the emergency department complaining of chest pain and palpitations.  Pertinent past medical history includes tobacco abuse, GERD, and  chronic low back pain with opioid dependence.  Patient presented to 5 systems appointment this morning when he began suddenly having palpitations and shortness of air, chest pain, and near syncope.  Patient quickly informed his coworkers of how he was feeling for which they then called 911.  Per EMS patient heart rate noted to be 260 for which 150 mg of amiodarone was administered.  Patient denied associated recent illness, new medications, fever, or other concerns.  Of  note patient states that his father is  from MI at the age of 52.  Patient currently lives with his wife who also states that he snores.  He does not currently have a PCP.     Upon ED presentation patient noted to have heart rate of 130 with atrial fibrillation noted on telemetry.  Patient was given 5 mg IV push of metoprolol.  He was initiated on Cardizem drip.  Pertinent laboratory findings include troponin within normal limits, creatinine 0.8, A1c 5.2, TSH 0.3, magnesium 1.8, chest x-ray within normal limits, and COVID-19 testing negative.  Echo without abnormalities noted. Hospitalist consulted for further medical management. Cardiologist Dr. Real also consulted.  Diltiazem and discontinued ordered.  Patient was made n.p.o. for possible cardioversion.  Thankfully, patient noted to be in sinus rhythm upon day of discharge.  Per cardiology patient was initiated on flecainide and metoprolol.  Patient to return to cardiologist outpatient office for repeat EKG in 3 days.  Strict return precautions advised. Tobacco/ETOH cessation strongly encouraged.  Patient also scheduled to establish care with PCP before discharge.    Vital Signs:    Temp:  [97.4 °F (36.3 °C)-98 °F (36.7 °C)] 97.4 °F (36.3 °C)  Heart Rate:  [] 81  Resp:  [18-20] 18  BP: (114-153)/(66-97) 141/97    Physical Exam:    General Appearance:  Alert and cooperative.    Head:  Atraumatic and normocephalic.   Eyes: Conjunctivae and sclerae normal, no icterus. No pallor.   Ears:  Ears with no abnormalities noted.   Throat: No oral lesions, no thrush, oral mucosa moist.   Neck: Supple, trachea midline, no thyromegaly.   Back:   No kyphoscoliosis present. No tenderness to palpation.   Lungs:   Breath sounds heard bilaterally equally.  No crackles or wheezing. No Pleural rub or bronchial breathing.   Heart:  Normal S1 and S2, no murmur, no gallop, no rub. No JVD.   Abdomen:   Normal bowel sounds, no masses, no organomegaly. Soft, nontender,  nondistended, no rebound tenderness.   Extremities: Supple, no edema, no cyanosis, no clubbing.   Pulses: Pulses palpable bilaterally.   Skin: No bleeding or rash.   Neurologic: Alert and oriented x 3. No facial asymmetry. Moves all four limbs. No tremors.     Pertinent Lab Results:     Results from last 7 days   Lab Units 07/12/22  0532 07/11/22  0655   SODIUM mmol/L 138 141   POTASSIUM mmol/L 3.7 3.9   CHLORIDE mmol/L 103 102   CO2 mmol/L 21.9* 20.5*   BUN mg/dL 17 19   CREATININE mg/dL 0.78 0.88   CALCIUM mg/dL 9.2 9.4   BILIRUBIN mg/dL  --  0.6   ALK PHOS U/L  --  88   ALT (SGPT) U/L  --  28   AST (SGOT) U/L  --  33   GLUCOSE mg/dL 143* 166*     Results from last 7 days   Lab Units 07/11/22  0655   WBC 10*3/mm3 4.02   HEMOGLOBIN g/dL 18.0*   HEMATOCRIT % 49.9   PLATELETS 10*3/mm3 142         Results from last 7 days   Lab Units 07/11/22  1506 07/11/22  1233 07/11/22  0655   TROPONIN T ng/mL <0.010 0.013 <0.010                           Pertinent Radiology Results:    Imaging Results (All)     Procedure Component Value Units Date/Time    XR Chest 1 View [[id number]] Collected: 07/11/22 0741     Updated: 07/11/22 0743    Narrative:      PROCEDURE: XR CHEST 1 VW-     HISTORY: Chest pain protocol     COMPARISON:  04/04/2021     FINDINGS:  Portable view of the chest demonstrates the lungs to be  grossly clear. There is no evidence of effusion, pneumothorax or other  significant pleural disease. The mediastinum is unremarkable.     The heart size is normal.       Impression:      Unremarkable portable chest.      This report was signed and finalized on 7/11/2022 7:41 AM by Ab Meeks MD.          Echo:    Results for orders placed during the hospital encounter of 07/11/22    Adult Transthoracic Echo Complete W/ Cont if Necessary Per Protocol    Interpretation Summary  1.  Normal left ventricular size and systolic function, LVEF 55-60%.  2.  Normal LV diastolic filling pattern.  3.  Normal right ventricular size and  systolic function.  4.  Normal left atrial volume index.  5.  No significant valvular abnormalities.    Condition on Discharge:      Stable.    Code status during the hospital stay:    Code Status and Medical Interventions:   Ordered at: 07/11/22 1132     Level Of Support Discussed With:    Patient     Code Status (Patient has no pulse and is not breathing):    CPR (Attempt to Resuscitate)     Medical Interventions (Patient has pulse or is breathing):    Full Support     Discharge Disposition:    Home or Self Care    Discharge Medications:       Discharge Medications      New Medications      Instructions Start Date   flecainide 50 MG tablet  Commonly known as: TAMBOCOR   50 mg, Oral, Every 12 Hours Scheduled   Start Date: July 13, 2022     metoprolol succinate XL 25 MG 24 hr tablet  Commonly known as: TOPROL-XL   25 mg, Oral, Every 24 Hours Scheduled      nicotine 21 MG/24HR patch  Commonly known as: NICODERM CQ   1 patch, Transdermal, Every 24 Hours Scheduled   Start Date: July 13, 2022        Continue These Medications      Instructions Start Date   HYDROcodone-acetaminophen 7.5-325 MG per tablet  Commonly known as: NORCO   1 tablet, Oral, Every 8 Hours PRN      omeprazole 40 MG capsule  Commonly known as: priLOSEC   40 mg, Oral, Daily         Stop These Medications    loratadine 10 MG tablet  Commonly known as: CLARITIN          Discharge Diet:     Diet Instructions     Diet: Cardiac      Discharge Diet: Cardiac        Activity at Discharge:     Activity Instructions     Activity as Tolerated          Follow-up Appointments:     Follow-up Information     Tenisha Olvera APRN Follow up on 7/19/2022.    Specialty: Nurse Practitioner  Why: 11:00am  Contact information:  [address]  [address]  [place] [facility] [address]  [phone]             Rosi Goldman APRN Follow up on 7/26/2022.    Specialties: Nurse Practitioner, Cardiology  Why: 03:00pm  Contact information:  [address]  [address]  [place] [facility] [place]  [address]  [phone]                       Future Appointments   Date Time Provider Department Center   7/19/2022 11:00 AM Tenisha Olvera APRN [facility]   7/26/2022  3:00 PM Rosi Goldman APRN [facility]     Test Results Pending at Discharge:           CRYSTAL Chun  07/12/22  10:20 EDT    Time: I spent 25 minutes on this discharge activity which included: face-to-face encounter with the patient, reviewing the data in the system, coordination of the care with the nursing staff as well as consultants, documentation, and entering orders.     Dictated utilizing Dragon dictation.

## 2022-07-13 ENCOUNTER — TRANSITIONAL CARE MANAGEMENT TELEPHONE ENCOUNTER (OUTPATIENT)
Dept: CALL CENTER | Facility: HOSPITAL | Age: 62
End: 2022-07-13

## 2022-07-13 ENCOUNTER — READMISSION MANAGEMENT (OUTPATIENT)
Dept: CALL CENTER | Facility: HOSPITAL | Age: 62
End: 2022-07-13

## 2022-07-13 LAB
QT INTERVAL: 424 MS
QTC INTERVAL: 440 MS

## 2022-07-13 NOTE — OUTREACH NOTE
Call Center TCM Note    Flowsheet Row Responses   [facility] patient discharged from? [facility]   Does the patient have one of the following disease processes/diagnoses(primary or secondary)? Other   TCM attempt successful? No   Unsuccessful attempts Attempt 2          Majo Rodriguez LPN    7/13/2022, 15:38 EDT

## 2022-07-13 NOTE — OUTREACH NOTE
Call Center TCM Note    Flowsheet Row Responses   [facility] patient discharged from? [facility]   Does the patient have one of the following disease processes/diagnoses(primary or secondary)? Other   TCM attempt successful? No   Unsuccessful attempts Attempt 1          Majo Rodriguez LPN    7/13/2022, 13:15 EDT

## 2022-07-13 NOTE — OUTREACH NOTE
Prep Survey    Flowsheet Row Responses   [facility] [place] patient discharged from? [place]   Is LACE score < 7 ? Yes   Emergency Room discharge w/ pulse ox? No   Eligibility [facility]   Date of Admission 07/11/22   Date of Discharge 07/12/22   Discharge Disposition Home or Self Care   Discharge diagnosis Atrial Fibrillation    Does the patient have one of the following disease processes/diagnoses(primary or secondary)? Other   Does the patient have Home health ordered? No   Is there a DME ordered? No   Prep survey completed? Yes          PAYTON FRYE - Registered Nurse

## 2022-07-14 ENCOUNTER — TRANSITIONAL CARE MANAGEMENT TELEPHONE ENCOUNTER (OUTPATIENT)
Dept: CALL CENTER | Facility: HOSPITAL | Age: 62
End: 2022-07-14

## 2022-07-14 NOTE — OUTREACH NOTE
Call Center TCM Note    Flowsheet Row Responses   [facility] patient discharged from? [facility]   Does the patient have one of the following disease processes/diagnoses(primary or secondary)? Other   TCM attempt successful? No   Unsuccessful attempts Attempt 3          BRISA GARCIA RN    7/14/2022, 12:52 EDT

## 2022-07-15 ENCOUNTER — CLINICAL SUPPORT (OUTPATIENT)
Dept: CARDIOLOGY | Facility: CLINIC | Age: 62
End: 2022-07-15

## 2022-07-15 DIAGNOSIS — I48.91 NEW ONSET ATRIAL FIBRILLATION: ICD-10-CM

## 2022-07-15 DIAGNOSIS — I48.91 NEW ONSET ATRIAL FIBRILLATION: Primary | ICD-10-CM

## 2022-07-15 PROCEDURE — 93000 ELECTROCARDIOGRAM COMPLETE: CPT | Performed by: INTERNAL MEDICINE

## 2022-07-19 ENCOUNTER — OFFICE VISIT (OUTPATIENT)
Dept: INTERNAL MEDICINE | Facility: CLINIC | Age: 62
End: 2022-07-19

## 2022-07-19 VITALS
HEIGHT: 72 IN | TEMPERATURE: 98 F | BODY MASS INDEX: 27.63 KG/M2 | RESPIRATION RATE: 16 BRPM | HEART RATE: 78 BPM | WEIGHT: 204 LBS | DIASTOLIC BLOOD PRESSURE: 80 MMHG | SYSTOLIC BLOOD PRESSURE: 125 MMHG | OXYGEN SATURATION: 99 %

## 2022-07-19 DIAGNOSIS — Z12.11 SCREENING FOR COLON CANCER: ICD-10-CM

## 2022-07-19 DIAGNOSIS — I48.91 NEW ONSET ATRIAL FIBRILLATION: Primary | ICD-10-CM

## 2022-07-19 DIAGNOSIS — M65.332 TRIGGER MIDDLE FINGER OF LEFT HAND: ICD-10-CM

## 2022-07-19 DIAGNOSIS — M25.50 MULTIPLE JOINT PAIN: ICD-10-CM

## 2022-07-19 DIAGNOSIS — Z72.0 NICOTINE ABUSE: ICD-10-CM

## 2022-07-19 DIAGNOSIS — Z76.89 ENCOUNTER TO ESTABLISH CARE: ICD-10-CM

## 2022-07-19 DIAGNOSIS — Z11.59 ENCOUNTER FOR HEPATITIS C SCREENING TEST FOR LOW RISK PATIENT: ICD-10-CM

## 2022-07-19 DIAGNOSIS — Z12.2 ENCOUNTER FOR SCREENING FOR LUNG CANCER: ICD-10-CM

## 2022-07-19 PROCEDURE — 99495 TRANSJ CARE MGMT MOD F2F 14D: CPT | Performed by: NURSE PRACTITIONER

## 2022-07-19 RX ORDER — PREDNISONE 20 MG/1
20 TABLET ORAL 2 TIMES DAILY
Qty: 10 TABLET | Refills: 0 | Status: SHIPPED | OUTPATIENT
Start: 2022-07-19 | End: 2022-07-24

## 2022-07-19 NOTE — PROGRESS NOTES
Transitional Care Follow Up Visit  Subjective     Markell Chilel is a 61 y.o. male who presents for a transitional care management visit and to establish care.  Patient was recently diagnosed with Afib and is following up for this.     Within 48 business hours after discharge our office contacted him via telephone to coordinate his care and needs.      I reviewed and discussed the details of that call along with the discharge summary, hospital problems, inpatient lab results, inpatient diagnostic studies, and consultation reports with Markell.     Current outpatient and discharge medications have been reconciled for the patient.  Reviewed by: CRYSTAL Douglas      Date of TCM Phone Call 2022   [facility]   Date of Admission 2022   Date of Discharge 2022   Discharge Disposition Home or Self Care     Risk for Readmission (LACE) Score: 1 (2022  6:01 AM)      History of Present Illness   Course During Hospital Stay:      61-year-old male presented via EMS to the emergency department complaining of chest pain and palpitations.  Pertinent past medical history includes tobacco abuse, GERD, and  chronic low back pain with opioid dependence.  Patient presented to 5 systems appointment this morning when he began suddenly having palpitations and shortness of air, chest pain, and near syncope.  Patient quickly informed his coworkers of how he was feeling for which they then called 911.  Per EMS patient heart rate noted to be 260 for which 150 mg of amiodarone was administered.  Patient denied associated recent illness, new medications, fever, or other concerns.  Of note patient states that his father is  from MI at the age of 52.  Patient currently lives with his wife who mentioned he snores. Upon ED presentation patient noted to have heart rate of 130 with atrial fibrillation noted on telemetry.  Patient was given 5 mg IV push of metoprolol.  He was initiated on Cardizem  drip.  Pertinent laboratory findings include troponin within normal limits, creatinine 0.8, A1c 5.2, TSH 0.3, magnesium 1.8, chest x-ray within normal limits, and COVID-19 testing negative.  Echo without abnormalities noted. Hospitalist consulted for further medical management. Cardiologist Dr. Real also consulted.  Diltiazem and discontinued ordered.  Patient was made n.p.o. for possible cardioversion.  Thankfully, patient noted to be in sinus rhythm upon day of discharge.  Per cardiology patient was initiated on flecainide and metoprolol.     CHADS-VASc Risk Assessment            0 Total Score        Criteria that do not apply:    CHF    Hypertension    Age >/= 75    DM    PRIOR STROKE/TIA/THROMBO    Vascular Disease    Age 65-74    Sex: Female             The following portions of the patient's history were reviewed and updated as appropriate: allergies, current medications, past family history, past medical history, past social history, past surgical history and problem list.    Review of Systems   Respiratory: Negative.    Cardiovascular: Negative.    Musculoskeletal: Positive for arthralgias.   All other systems reviewed and are negative.      Objective   Physical Exam  Vitals and nursing note reviewed.   Constitutional:       General: He is not in acute distress.     Appearance: Normal appearance. He is normal weight. He is not diaphoretic.   HENT:      Head: Normocephalic and atraumatic.   Eyes:      Extraocular Movements: Extraocular movements intact.      Pupils: Pupils are equal, round, and reactive to light.   Neck:      Vascular: No carotid bruit or JVD.   Cardiovascular:      Rate and Rhythm: Normal rate and regular rhythm.   Pulmonary:      Effort: Pulmonary effort is normal.      Breath sounds: Normal breath sounds.   Abdominal:      General: Abdomen is flat. Bowel sounds are normal.      Palpations: Abdomen is soft.   Musculoskeletal:         General: Normal range of motion.      Cervical back:  Normal range of motion and neck supple.      Right lower leg: No edema.      Left lower leg: No edema.      Comments: Trigger finger noted to left ring, no edema, erythema, deformity, tenderness   Skin:     General: Skin is warm and dry.      Capillary Refill: Capillary refill takes less than 2 seconds.   Neurological:      General: No focal deficit present.      Mental Status: He is alert and oriented to person, place, and time.   Psychiatric:         Mood and Affect: Mood normal.         Behavior: Behavior normal.         Thought Content: Thought content normal.         Judgment: Judgment normal.         Assessment & Plan   Diagnoses and all orders for this visit:    1. New onset atrial fibrillation (HCC) (Primary)  -     Patient to f/u with Cardiology 7/26/22  - CHADS VASc score is 0.   - Records reviewed.     2. Multiple joint pain  -     Rheumatoid Factor; Future  -     Sedimentation rate, automated; Future  -     C-reactive protein; Future  -     LIDIA; Future    3. Trigger middle finger of left hand  -     predniSONE (DELTASONE) 20 MG tablet; Take 1 tablet by mouth 2 (Two) Times a Day for 5 days.  Dispense: 10 tablet; Refill: 0    4. Screening for colon cancer  -     Ambulatory Referral For Screening Colonoscopy    5. Encounter to establish care    6. Encounter for hepatitis C screening test for low risk patient  -     Hepatitis C antibody    7. Encounter for screening for lung cancer  -     CT Chest Low Dose Wo    8. Nicotine abuse  -     CT Chest Low Dose Wo              F/u 6 months Annual

## 2022-07-27 ENCOUNTER — OFFICE VISIT (OUTPATIENT)
Dept: CARDIOLOGY | Facility: CLINIC | Age: 62
End: 2022-07-27

## 2022-07-27 VITALS
DIASTOLIC BLOOD PRESSURE: 84 MMHG | SYSTOLIC BLOOD PRESSURE: 138 MMHG | HEART RATE: 70 BPM | HEIGHT: 72 IN | BODY MASS INDEX: 27.9 KG/M2 | OXYGEN SATURATION: 99 % | WEIGHT: 206 LBS

## 2022-07-27 DIAGNOSIS — I48.91 NEW ONSET ATRIAL FIBRILLATION: Primary | ICD-10-CM

## 2022-07-27 PROCEDURE — 93000 ELECTROCARDIOGRAM COMPLETE: CPT | Performed by: NURSE PRACTITIONER

## 2022-07-27 PROCEDURE — 99213 OFFICE O/P EST LOW 20 MIN: CPT | Performed by: NURSE PRACTITIONER

## 2022-08-03 ENCOUNTER — LAB (OUTPATIENT)
Dept: LAB | Facility: HOSPITAL | Age: 62
End: 2022-08-03

## 2022-08-03 PROCEDURE — 86038 ANTINUCLEAR ANTIBODIES: CPT | Performed by: NURSE PRACTITIONER

## 2022-08-03 PROCEDURE — 86431 RHEUMATOID FACTOR QUANT: CPT | Performed by: NURSE PRACTITIONER

## 2022-08-03 PROCEDURE — 85652 RBC SED RATE AUTOMATED: CPT | Performed by: NURSE PRACTITIONER

## 2022-08-03 PROCEDURE — 86803 HEPATITIS C AB TEST: CPT | Performed by: NURSE PRACTITIONER

## 2022-08-03 PROCEDURE — 86140 C-REACTIVE PROTEIN: CPT | Performed by: NURSE PRACTITIONER

## 2022-08-11 RX ORDER — FLECAINIDE ACETATE 50 MG/1
50 TABLET ORAL EVERY 12 HOURS SCHEDULED
Qty: 60 TABLET | Refills: 3 | Status: SHIPPED | OUTPATIENT
Start: 2022-08-11 | End: 2022-12-05

## 2022-08-11 RX ORDER — METOPROLOL SUCCINATE 25 MG/1
25 TABLET, EXTENDED RELEASE ORAL
Qty: 30 TABLET | Refills: 3 | Status: SHIPPED | OUTPATIENT
Start: 2022-08-11 | End: 2022-12-05

## 2022-12-05 RX ORDER — FLECAINIDE ACETATE 50 MG/1
TABLET ORAL
Qty: 180 TABLET | Refills: 3 | Status: SHIPPED | OUTPATIENT
Start: 2022-12-05

## 2022-12-05 RX ORDER — METOPROLOL SUCCINATE 25 MG/1
TABLET, EXTENDED RELEASE ORAL
Qty: 90 TABLET | Refills: 3 | Status: SHIPPED | OUTPATIENT
Start: 2022-12-05

## 2023-01-24 RX ORDER — OMEPRAZOLE 40 MG/1
CAPSULE, DELAYED RELEASE ORAL
Qty: 90 CAPSULE | Refills: 3 | Status: SHIPPED | OUTPATIENT
Start: 2023-01-24

## 2023-03-27 ENCOUNTER — OFFICE VISIT (OUTPATIENT)
Dept: INTERNAL MEDICINE | Facility: CLINIC | Age: 63
End: 2023-03-27
Payer: COMMERCIAL

## 2023-03-27 VITALS
DIASTOLIC BLOOD PRESSURE: 84 MMHG | HEART RATE: 69 BPM | OXYGEN SATURATION: 99 % | WEIGHT: 206 LBS | SYSTOLIC BLOOD PRESSURE: 136 MMHG | BODY MASS INDEX: 27.9 KG/M2 | TEMPERATURE: 97.6 F | HEIGHT: 72 IN | RESPIRATION RATE: 17 BRPM

## 2023-03-27 DIAGNOSIS — M25.50 MULTIPLE JOINT PAIN: Primary | ICD-10-CM

## 2023-03-27 DIAGNOSIS — R53.83 FATIGUE, UNSPECIFIED TYPE: ICD-10-CM

## 2023-03-27 DIAGNOSIS — N52.9 ERECTILE DYSFUNCTION, UNSPECIFIED ERECTILE DYSFUNCTION TYPE: ICD-10-CM

## 2023-03-27 DIAGNOSIS — R79.89 LOW TESTOSTERONE: ICD-10-CM

## 2023-03-27 DIAGNOSIS — M79.10 MUSCLE PAIN: ICD-10-CM

## 2023-03-27 RX ORDER — TADALAFIL 5 MG/1
5 TABLET ORAL DAILY PRN
Qty: 10 TABLET | Refills: 0 | Status: SHIPPED | OUTPATIENT
Start: 2023-03-27

## 2023-03-28 NOTE — PROGRESS NOTES
"Chief Complaint   Patient presents with   • Generalized Body Aches     Joint pain/muscle pain, request refill of pain meds   • Fatigue     Requesting lab work     Subjective   Markell Chilel is a 62 y.o. male.     History of Present Illness     Patient requesting labs. Generalized body aches, fatigue, joint and muscle pains. Use to see pain management and had multiple injections, over 16 and never had relief and stopped going. Asking for refill of opiate as needed for pain that was previously prescribed by pain management. Had previous work up that was normal.  Wanting testosterone checked, low libido, able to get erection in morning and able to climax, issues with erection in the evenings, states he is so fatigued in the evenings unsure if related to that or low testosterone. He previously received injections for low testosterone years ago.   Needs to reschedule with cardiology related to afib follow up       The following portions of the patient's history were reviewed and updated as appropriate: allergies, current medications, past family history, past medical history, past social history, past surgical history and problem list.    Objective   /84   Pulse 69   Temp 97.6 °F (36.4 °C)   Resp 17   Ht 182.9 cm (72\")   Wt 93.4 kg (206 lb)   SpO2 99%   BMI 27.94 kg/m²   Body mass index is 27.94 kg/m².  Physical Exam  Vitals and nursing note reviewed.   Constitutional:       General: He is not in acute distress.     Appearance: Normal appearance. He is normal weight. He is not diaphoretic.   HENT:      Head: Normocephalic and atraumatic.   Eyes:      Extraocular Movements: Extraocular movements intact.      Pupils: Pupils are equal, round, and reactive to light.   Neck:      Vascular: No carotid bruit or JVD.   Cardiovascular:      Rate and Rhythm: Normal rate and regular rhythm.   Pulmonary:      Effort: Pulmonary effort is normal.      Breath sounds: Normal breath sounds.   Abdominal:      General: " Abdomen is flat. Bowel sounds are normal.      Palpations: Abdomen is soft.   Musculoskeletal:         General: No swelling or deformity. Normal range of motion.      Cervical back: Normal range of motion and neck supple.      Right lower leg: No edema.      Left lower leg: No edema.   Skin:     General: Skin is warm and dry.      Capillary Refill: Capillary refill takes less than 2 seconds.   Neurological:      General: No focal deficit present.      Mental Status: He is alert and oriented to person, place, and time.   Psychiatric:         Mood and Affect: Mood normal.         Behavior: Behavior normal.         Thought Content: Thought content normal.         Judgment: Judgment normal.         Assessment & Plan   Markell Chilel is here today and the following problems have been addressed:      Diagnoses and all orders for this visit:    1. Multiple joint pain (Primary)  -     CBC No Differential  -     Comprehensive Metabolic Panel  -     Sedimentation rate, automated  -     C-reactive protein  -     Cyclic Citrul Peptide Antibody, IgG / IgA  -     Rheumatoid Factor  -     Magnesium  -     Vitamin B12  -     Folate  -     Vitamin B6  -     Vitamin B1, Whole Blood  -     Testosterone (Free & Total), LC / MS    2. Muscle pain  -     CBC No Differential  -     Comprehensive Metabolic Panel  -     Sedimentation rate, automated  -     C-reactive protein  -     Cyclic Citrul Peptide Antibody, IgG / IgA  -     Rheumatoid Factor  -     Magnesium  -     Vitamin B12  -     Folate  -     Vitamin B6  -     Vitamin B1, Whole Blood  -     Testosterone (Free & Total), LC / MS    3. Fatigue, unspecified type  -     CBC No Differential  -     Comprehensive Metabolic Panel  -     Sedimentation rate, automated  -     C-reactive protein  -     Cyclic Citrul Peptide Antibody, IgG / IgA  -     Rheumatoid Factor  -     Magnesium  -     Vitamin B12  -     Folate  -     Vitamin B6  -     Vitamin B1, Whole Blood  -     Testosterone  (Free & Total), LC / MS    4. Low testosterone  -     Testosterone (Free & Total), LC / MS    5. Erectile dysfunction, unspecified erectile dysfunction type  -     Testosterone (Free & Total), LC / MS  -     tadalafil (Cialis) 5 MG tablet; Take 1 tablet by mouth Daily As Needed for Erectile Dysfunction.  Dispense: 10 tablet; Refill: 0    Lab workup above for complaints. Labs will need drawn fasting at 8AM for testosterone levels to be accurate   I do not send opiates for chronic pain - will need to see pain management for this, discussed other methods for treatment of pain  Will do trial of cialis 5 mg PRN, can titrate up if needed but will start low dose, discussed risks and side effects of medication jessica with hypertensive meds, ER if erection >3 hours   Reschedule follow up cardiology r/t afib, cancelled 6 month follow up     Follow Up   Return for Annual.  Patient was given instructions and counseling regarding his condition or for health maintenance advice. Please see specific information pulled into the AVS if appropriate.     Tenisha ROJAS  [facility] [place]    ION spent 38 minutes caring for Markell Chilel on this date of service. This time includes time spent by me in the following activities: preparing for the visit, reviewing tests, performing a medically appropriate examination and/or evaluation, counseling and educating the patient/family/caregiver, ordering medications, tests, or procedures and documenting information in the medical record.

## 2023-04-05 LAB
ALBUMIN SERPL-MCNC: 4.7 G/DL (ref 3.8–4.9)
ALBUMIN/GLOB SERPL: 1.9 {RATIO} (ref 1.2–2.2)
ALP SERPL-CCNC: 93 IU/L (ref 44–121)
ALT SERPL-CCNC: 36 IU/L (ref 0–44)
AST SERPL-CCNC: 30 IU/L (ref 0–40)
BILIRUB SERPL-MCNC: 0.6 MG/DL (ref 0–1.2)
BUN SERPL-MCNC: 16 MG/DL (ref 8–27)
BUN/CREAT SERPL: 22 (ref 10–24)
CALCIUM SERPL-MCNC: 9.7 MG/DL (ref 8.6–10.2)
CCP IGA+IGG SERPL IA-ACNC: 7 UNITS (ref 0–19)
CHLORIDE SERPL-SCNC: 103 MMOL/L (ref 96–106)
CO2 SERPL-SCNC: 26 MMOL/L (ref 20–29)
CREAT SERPL-MCNC: 0.73 MG/DL (ref 0.76–1.27)
CRP SERPL-MCNC: 1 MG/L (ref 0–10)
EGFRCR SERPLBLD CKD-EPI 2021: 104 ML/MIN/1.73
ERYTHROCYTE [DISTWIDTH] IN BLOOD BY AUTOMATED COUNT: 12.1 % (ref 11.6–15.4)
ERYTHROCYTE [SEDIMENTATION RATE] IN BLOOD BY WESTERGREN METHOD: 11 MM/HR (ref 0–30)
FOLATE SERPL-MCNC: 2.7 NG/ML
GLOBULIN SER CALC-MCNC: 2.5 G/DL (ref 1.5–4.5)
GLUCOSE SERPL-MCNC: 100 MG/DL (ref 70–99)
HCT VFR BLD AUTO: 48.9 % (ref 37.5–51)
HGB BLD-MCNC: 17 G/DL (ref 13–17.7)
MAGNESIUM SERPL-MCNC: 2.1 MG/DL (ref 1.6–2.3)
MCH RBC QN AUTO: 31.4 PG (ref 26.6–33)
MCHC RBC AUTO-ENTMCNC: 34.8 G/DL (ref 31.5–35.7)
MCV RBC AUTO: 90 FL (ref 79–97)
PLATELET # BLD AUTO: 222 X10E3/UL (ref 150–450)
POTASSIUM SERPL-SCNC: 4.7 MMOL/L (ref 3.5–5.2)
PROT SERPL-MCNC: 7.2 G/DL (ref 6–8.5)
PYRIDOXAL PHOS SERPL-MCNC: 13.4 UG/L (ref 3.4–65.2)
RBC # BLD AUTO: 5.41 X10E6/UL (ref 4.14–5.8)
RHEUMATOID FACT SERPL-ACNC: <10 IU/ML
SODIUM SERPL-SCNC: 142 MMOL/L (ref 134–144)
TESTOST FREE SERPL-MCNC: 10.3 PG/ML (ref 6.6–18.1)
TESTOST SERPL-MCNC: 526.3 NG/DL (ref 264–916)
VIT B1 BLD-SCNC: 155.1 NMOL/L (ref 66.5–200)
VIT B12 SERPL-MCNC: 308 PG/ML (ref 232–1245)
WBC # BLD AUTO: 5.8 X10E3/UL (ref 3.4–10.8)

## 2023-07-27 ENCOUNTER — TELEPHONE (OUTPATIENT)
Dept: INTERNAL MEDICINE | Facility: CLINIC | Age: 63
End: 2023-07-27
Payer: COMMERCIAL

## 2023-07-27 NOTE — TELEPHONE ENCOUNTER
CT Chest Low Dose Wo [[id number]] (Order [id number])  Order    Date: 2022 Department: [facility] PRIMARY CARE Ordering/Authorizing: Tenisha Olvera APRN   This order is too old to be used and has been cancelled.

## 2023-12-04 RX ORDER — FLECAINIDE ACETATE 50 MG/1
TABLET ORAL
Qty: 180 TABLET | Refills: 3 | OUTPATIENT
Start: 2023-12-04

## 2023-12-04 RX ORDER — METOPROLOL SUCCINATE 25 MG/1
TABLET, EXTENDED RELEASE ORAL
Qty: 90 TABLET | Refills: 3 | OUTPATIENT
Start: 2023-12-04

## 2023-12-05 RX ORDER — METOPROLOL SUCCINATE 25 MG/1
TABLET, EXTENDED RELEASE ORAL
Qty: 90 TABLET | Refills: 3 | Status: SHIPPED | OUTPATIENT
Start: 2023-12-05

## 2023-12-26 RX ORDER — OMEPRAZOLE 40 MG/1
CAPSULE, DELAYED RELEASE ORAL
Qty: 90 CAPSULE | Refills: 3 | Status: SHIPPED | OUTPATIENT
Start: 2023-12-26

## 2024-04-24 ENCOUNTER — OFFICE VISIT (OUTPATIENT)
Dept: CARDIOLOGY | Facility: CLINIC | Age: 64
End: 2024-04-24
Payer: COMMERCIAL

## 2024-04-24 VITALS
DIASTOLIC BLOOD PRESSURE: 60 MMHG | SYSTOLIC BLOOD PRESSURE: 122 MMHG | RESPIRATION RATE: 18 BRPM | WEIGHT: 207 LBS | OXYGEN SATURATION: 96 % | BODY MASS INDEX: 28.04 KG/M2 | HEIGHT: 72 IN | HEART RATE: 69 BPM

## 2024-04-24 DIAGNOSIS — I48.0 PAF (PAROXYSMAL ATRIAL FIBRILLATION): ICD-10-CM

## 2024-04-24 DIAGNOSIS — G47.33 OSA (OBSTRUCTIVE SLEEP APNEA): Primary | ICD-10-CM

## 2024-04-24 PROCEDURE — 93000 ELECTROCARDIOGRAM COMPLETE: CPT | Performed by: INTERNAL MEDICINE

## 2024-04-24 PROCEDURE — 99214 OFFICE O/P EST MOD 30 MIN: CPT | Performed by: INTERNAL MEDICINE

## 2024-04-24 RX ORDER — OMEPRAZOLE 40 MG/1
40 CAPSULE, DELAYED RELEASE ORAL DAILY
Qty: 90 CAPSULE | Refills: 3 | Status: SHIPPED | OUTPATIENT
Start: 2024-04-24

## 2024-04-24 RX ORDER — FLECAINIDE ACETATE 50 MG/1
50 TABLET ORAL EVERY 12 HOURS
Qty: 180 TABLET | Refills: 3 | Status: SHIPPED | OUTPATIENT
Start: 2024-04-24

## 2024-04-24 RX ORDER — METOPROLOL SUCCINATE 25 MG/1
25 TABLET, EXTENDED RELEASE ORAL DAILY
Qty: 90 TABLET | Refills: 3 | Status: SHIPPED | OUTPATIENT
Start: 2024-04-24

## 2024-04-24 NOTE — PROGRESS NOTES
[facility] Cardiology Office Follow Up Note    Markell Chilel  [id number]  2024    Primary Care Provider: Tenisha Fuchs APRN    Chief Complaint: Routine follow-up    History of Present Illness:   Mr. Markell Chilel is a 63 y.o. male who presents to the Cardiology Clinic for routine follow-up.  The patient has a past medical history significant for GERD, chronic lower back pain with chronic opioid use, and chronic tobacco use with a 1 pack/day use.  He has a past cardiac history significant for paroxysmal atrial fibrillation with rapid ventricular rates.  Today, the patient reports he is doing well from a cardiac standpoint.  He has not had significant episodes of palpitations or symptoms to suggest recurrent sustained PAF.  He is tolerating his current cardiac medications without difficulty.  No chest pain or chest discomfort.  No other specific complaints today.     Past Cardiac Testin. Last Coronary Angio: None  2. Prior Stress Testing: Remote (?), reportedly normal  3. Last Echo: 2022              1.  Normal left ventricular size and systolic function, LVEF 55-60%.              2.  Normal LV diastolic filling pattern.              3.  Normal right ventricular size and systolic function.              4.  Normal left atrial volume index.              5.  No significant valvular abnormalities.  4. Prior Holter Monitor: None    Review of Systems:   Review of Systems   Constitutional:  Negative for activity change, appetite change, chills, diaphoresis, fatigue, fever, unexpected weight gain and unexpected weight loss.   Eyes:  Negative for blurred vision and double vision.   Respiratory:  Negative for cough, chest tightness, shortness of breath and wheezing.    Cardiovascular:  Negative for chest pain, palpitations and leg swelling.   Gastrointestinal:  Negative for abdominal pain, anal bleeding, blood in stool and GERD.   Endocrine: Negative for cold  "intolerance and heat intolerance.   Genitourinary:  Negative for hematuria.   Neurological:  Negative for dizziness, syncope, weakness and light-headedness.   Hematological:  Does not bruise/bleed easily.   Psychiatric/Behavioral:  Negative for depressed mood and stress. The patient is not nervous/anxious.        I have reviewed and/or updated the patient's past medical, past surgical, family, social history, problem list and allergies as appropriate.     Medications:     Current Outpatient Medications:     albuterol sulfate  (90 Base) MCG/ACT inhaler, Inhale 2 puffs Every 4 (Four) Hours As Needed for Wheezing or Shortness of Air., Disp: 18 g, Rfl: 0    flecainide (TAMBOCOR) 50 MG tablet, Take 1 tablet by mouth Every 12 (Twelve) Hours., Disp: 180 tablet, Rfl: 3    HYDROcodone-acetaminophen (NORCO) 7.5-325 MG per tablet, Take 1 tablet by mouth Every 8 (Eight) Hours As Needed for Moderate Pain., Disp: , Rfl:     metoprolol succinate XL (TOPROL-XL) 25 MG 24 hr tablet, Take 1 tablet by mouth Daily., Disp: 90 tablet, Rfl: 3    omeprazole (priLOSEC) 40 MG capsule, Take 1 capsule by mouth Daily., Disp: 90 capsule, Rfl: 3    tadalafil (Cialis) 5 MG tablet, Take 1 tablet by mouth Daily As Needed for Erectile Dysfunction., Disp: 10 tablet, Rfl: 0    Physical Exam:  Vital Signs:   Vitals:    04/24/24 1514   BP: 122/60   BP Location: Left arm   Patient Position: Sitting   Pulse: 69   Resp: 18   SpO2: 96%   Weight: 93.9 kg (207 lb)   Height: 182.9 cm (72\")       Physical Exam  Constitutional:       General: He is not in acute distress.     Appearance: Normal appearance. He is not diaphoretic.   HENT:      Head: Normocephalic and atraumatic.   Cardiovascular:      Rate and Rhythm: Normal rate and regular rhythm.      Heart sounds: No murmur heard.  Pulmonary:      Effort: Pulmonary effort is normal. No respiratory distress.      Breath sounds: Normal breath sounds. No stridor. No wheezing, rhonchi or rales.   Abdominal:    "   General: Bowel sounds are normal. There is no distension.      Palpations: Abdomen is soft.      Tenderness: There is no abdominal tenderness. There is no guarding or rebound.   Musculoskeletal:         General: No swelling. Normal range of motion.      Cervical back: Neck supple. No tenderness.   Skin:     General: Skin is warm and dry.   Neurological:      General: No focal deficit present.      Mental Status: He is alert and oriented to person, place, and time.   Psychiatric:         Mood and Affect: Mood normal.         Behavior: Behavior normal.         Results Review:   I reviewed the patient's new clinical results.  I personally viewed and interpreted the patient's EKG/Telemetry data      ECG 12 Lead    Date/Time: 4/24/2024 11:32 AM  Performed by: Christian Real MD    Authorized by: Christian Real MD  Comparison: not compared with previous ECG   Previous ECG: no previous ECG available  Rhythm: sinus rhythm  Rate: normal  QRS axis: normal  Other findings: T wave abnormality and left ventricular hypertrophy    Clinical impression: abnormal EKG          Assessment / Plan:     1.  Paroxysmal atrial fibrillation  -- Currently maintaining sinus rhythm  -- Continue flecainide and metoprolol  -- [id number] 1, has deferred anticoagulation for now  -- Follow-up in 1 year, sooner if required    2.  Obstructive sleep apnea  --Referral to pulmonology    Follow Up:   Return in about 1 year (around 4/24/2025).      Thank you for allowing me to participate in the care of your patient. Please to not hesitate to contact me with additional questions or concerns.     LB Real MD  Interventional Cardiology   04/24/2024  15:05 EDT

## 2024-04-26 DIAGNOSIS — N52.9 ERECTILE DYSFUNCTION, UNSPECIFIED ERECTILE DYSFUNCTION TYPE: ICD-10-CM

## 2024-04-26 RX ORDER — TADALAFIL 5 MG/1
5 TABLET ORAL DAILY PRN
Qty: 10 TABLET | Refills: 0 | OUTPATIENT
Start: 2024-04-26

## 2024-09-30 ENCOUNTER — OFFICE VISIT (OUTPATIENT)
Dept: INTERNAL MEDICINE | Facility: CLINIC | Age: 64
End: 2024-09-30
Payer: COMMERCIAL

## 2024-09-30 VITALS
SYSTOLIC BLOOD PRESSURE: 155 MMHG | HEIGHT: 72 IN | TEMPERATURE: 97.3 F | OXYGEN SATURATION: 98 % | WEIGHT: 192 LBS | RESPIRATION RATE: 18 BRPM | BODY MASS INDEX: 26.01 KG/M2 | HEART RATE: 73 BPM | DIASTOLIC BLOOD PRESSURE: 84 MMHG

## 2024-09-30 DIAGNOSIS — I48.0 PAROXYSMAL ATRIAL FIBRILLATION: ICD-10-CM

## 2024-09-30 DIAGNOSIS — Z13.0 SCREENING FOR DEFICIENCY ANEMIA: ICD-10-CM

## 2024-09-30 DIAGNOSIS — Z13.0 SCREENING FOR ENDOCRINE, METABOLIC AND IMMUNITY DISORDER: ICD-10-CM

## 2024-09-30 DIAGNOSIS — R03.0 ELEVATED BLOOD PRESSURE READING WITHOUT DIAGNOSIS OF HYPERTENSION: ICD-10-CM

## 2024-09-30 DIAGNOSIS — Z72.0 TOBACCO ABUSE: ICD-10-CM

## 2024-09-30 DIAGNOSIS — Z12.11 SCREENING FOR COLON CANCER: ICD-10-CM

## 2024-09-30 DIAGNOSIS — F41.9 ANXIETY: ICD-10-CM

## 2024-09-30 DIAGNOSIS — J06.9 ACUTE URI: ICD-10-CM

## 2024-09-30 DIAGNOSIS — F17.200 ENCOUNTER FOR SCREENING FOR MALIGNANT NEOPLASM OF LUNG IN CURRENT SMOKER WITH 30 PACK YEAR HISTORY OR GREATER: ICD-10-CM

## 2024-09-30 DIAGNOSIS — Z78.9 ALCOHOL USE: ICD-10-CM

## 2024-09-30 DIAGNOSIS — Z00.00 ANNUAL PHYSICAL EXAM: Primary | ICD-10-CM

## 2024-09-30 DIAGNOSIS — Z13.29 SCREENING FOR ENDOCRINE, METABOLIC AND IMMUNITY DISORDER: ICD-10-CM

## 2024-09-30 DIAGNOSIS — E53.8 FOLATE DEFICIENCY: ICD-10-CM

## 2024-09-30 DIAGNOSIS — Z12.5 SCREENING PSA (PROSTATE SPECIFIC ANTIGEN): ICD-10-CM

## 2024-09-30 DIAGNOSIS — R53.82 CHRONIC FATIGUE: ICD-10-CM

## 2024-09-30 DIAGNOSIS — K21.9 GASTROESOPHAGEAL REFLUX DISEASE WITHOUT ESOPHAGITIS: ICD-10-CM

## 2024-09-30 DIAGNOSIS — N52.9 ERECTILE DYSFUNCTION, UNSPECIFIED ERECTILE DYSFUNCTION TYPE: ICD-10-CM

## 2024-09-30 DIAGNOSIS — M19.90 ARTHRITIS: ICD-10-CM

## 2024-09-30 DIAGNOSIS — Z13.228 SCREENING FOR ENDOCRINE, METABOLIC AND IMMUNITY DISORDER: ICD-10-CM

## 2024-09-30 DIAGNOSIS — E55.9 VITAMIN D DEFICIENCY: ICD-10-CM

## 2024-09-30 DIAGNOSIS — Z12.2 ENCOUNTER FOR SCREENING FOR MALIGNANT NEOPLASM OF LUNG IN CURRENT SMOKER WITH 30 PACK YEAR HISTORY OR GREATER: ICD-10-CM

## 2024-09-30 DIAGNOSIS — Z13.220 SCREENING FOR CHOLESTEROL LEVEL: ICD-10-CM

## 2024-09-30 PROCEDURE — 99214 OFFICE O/P EST MOD 30 MIN: CPT | Performed by: NURSE PRACTITIONER

## 2024-09-30 PROCEDURE — 99396 PREV VISIT EST AGE 40-64: CPT | Performed by: NURSE PRACTITIONER

## 2024-09-30 RX ORDER — TADALAFIL 5 MG/1
5 TABLET ORAL DAILY PRN
Qty: 10 TABLET | Refills: 0 | Status: SHIPPED | OUTPATIENT
Start: 2024-09-30

## 2024-09-30 RX ORDER — HYDROXYZINE HYDROCHLORIDE 25 MG/1
25 TABLET, FILM COATED ORAL 3 TIMES DAILY PRN
Qty: 90 TABLET | Refills: 0 | Status: SHIPPED | OUTPATIENT
Start: 2024-09-30

## 2024-09-30 NOTE — PROGRESS NOTES
Male Physical Note      Date: 2024   Patient Name: Markell Chilel  : 1960   MRN: [id number]     Chief Complaint:    Chief Complaint   Patient presents with    Annual Exam    Erectile Dysfunction       History of Present Illness: Markell Chilel is a 64 y.o. male who is here today for annual health maintenance and physical.  Hx paroxsymal afib, compliant with metoprolol, flecainide, stable.  Follows cardiology.  No chest pain, palpitations, soa, syncope.  ED- sent a trial of Cialis last visit but patient never picked up or tried and prescription .  He is interested in trying this due to issues with ED  Chronic fatigue-requesting labs today  Arthritis-seeing orthopedics and getting back injections every 6 months  GERD-stable on omeprazole  Smoker, alcohol use-continues to smoke 1 pack a day x 30 years.  He is agreeable to a lung cancer screening has never had one.  He drinks up around 14 beers per week.  He admits to binge drinking last night during a game and he reports doing this socially and during games.  He has had elevated LFTs in the past due to drinking.  His blood pressure is elevated today.  He does not have history of hypertension.  He has a blood pressure cuff at home but does not typically monitor his blood pressure.  He did admit to binge drinking last night.  Anxiety-still has anxious episodes, he has been a long time since he had a panic attack.  He has taken Xanax in the past with benefit but was told that since he is on opiates that he is not allowed to be on benzos.  The benzos did help with panic attacks.  He was sick with a viral illness around 2 weeks ago and he is wondering if he had COVID.  He is wondering if there is any labs that can check to see, has some lingering congestion  2 colon screening, he would like to do the Cologuard versus colonoscopy  Screenings and vaccinations reviewed, declines vaccines    Subjective      Review of Systems:   Review of  Systems   Constitutional:  Positive for fatigue.   Genitourinary:  Positive for erectile dysfunction.   All other systems reviewed and are negative.      Past Medical History, Social History, Family History and Care Team were all reviewed with patient and updated as appropriate.     Medications:     Current Outpatient Medications:     albuterol sulfate  (90 Base) MCG/ACT inhaler, Inhale 2 puffs Every 4 (Four) Hours As Needed for Wheezing or Shortness of Air., Disp: 18 g, Rfl: 0    flecainide (TAMBOCOR) 50 MG tablet, Take 1 tablet by mouth Every 12 (Twelve) Hours., Disp: 180 tablet, Rfl: 3    HYDROcodone-acetaminophen (NORCO) 7.5-325 MG per tablet, Take 1 tablet by mouth Every 8 (Eight) Hours As Needed for Moderate Pain., Disp: , Rfl:     metoprolol succinate XL (TOPROL-XL) 25 MG 24 hr tablet, Take 1 tablet by mouth Daily., Disp: 90 tablet, Rfl: 3    omeprazole (priLOSEC) 40 MG capsule, Take 1 capsule by mouth Daily., Disp: 90 capsule, Rfl: 3    tadalafil (Cialis) 5 MG tablet, Take 1 tablet by mouth Daily As Needed for Erectile Dysfunction., Disp: 10 tablet, Rfl: 0    hydrOXYzine (ATARAX) 25 MG tablet, Take 1 tablet by mouth 3 (Three) Times a Day As Needed for Anxiety., Disp: 90 tablet, Rfl: 0    Allergies:   Allergies   Allergen Reactions    Penicillins Hives       Immunizations:  Immunization History   Administered Date(s) Administered    COVID-19 (MODERNA) 1st,2nd,3rd Dose Monovalent 03/19/2021, 04/16/2021          Tobacco Use: High Risk (9/30/2024)    Patient History     Smoking Tobacco Use: Every Day     Smokeless Tobacco Use: Never     Passive Exposure: Past       Social History     Substance and Sexual Activity   Alcohol Use Yes    Alcohol/week: 14.0 standard drinks of alcohol    Types: 14 Cans of beer per week    Comment: 14 beers weekly        Social History     Substance and Sexual Activity   Drug Use No      PHQ-2 Depression Screening  PHQ-9 Total Score: 0      The 10-year ASCVD risk score (Marlon  "PARBHA et al., 2019) is: 18.1%    Values used to calculate the score:      Age: 64 years      Sex: Male      Is Non- : No      Diabetic: No      Tobacco smoker: Yes      Systolic Blood Pressure: 155 mmHg      Is BP treated: No      HDL Cholesterol: 54 mg/dL      Total Cholesterol: 181 mg/dL    Objective     Physical Exam:  Vital Signs:   Vitals:    09/30/24 0840 09/30/24 0848   BP: 153/84 155/84   Pulse: 73    Resp: 18    Temp: 97.3 °F (36.3 °C)    TempSrc: Infrared    SpO2: 98%    Weight: 87.1 kg (192 lb)    Height: 182.9 cm (72.01\")    PainSc: 0-No pain      Body mass index is 26.03 kg/m².       Physical Exam  Vitals and nursing note reviewed.   Constitutional:       General: He is not in acute distress.     Appearance: Normal appearance.   HENT:      Head: Normocephalic and atraumatic.      Right Ear: Tympanic membrane, ear canal and external ear normal.      Left Ear: Tympanic membrane, ear canal and external ear normal.      Nose: Nose normal. Congestion present.      Mouth/Throat:      Mouth: Mucous membranes are moist.      Pharynx: Oropharynx is clear.   Eyes:      General: No scleral icterus.     Extraocular Movements: Extraocular movements intact.      Conjunctiva/sclera: Conjunctivae normal.      Pupils: Pupils are equal, round, and reactive to light.   Neck:      Thyroid: No thyromegaly.      Vascular: No carotid bruit.   Cardiovascular:      Rate and Rhythm: Normal rate and regular rhythm.      Pulses:           Dorsalis pedis pulses are 2+ on the right side and 2+ on the left side.        Posterior tibial pulses are 2+ on the right side and 2+ on the left side.      Heart sounds: Normal heart sounds. No murmur heard.     No friction rub. No gallop.   Pulmonary:      Effort: Pulmonary effort is normal. No respiratory distress.      Breath sounds: Normal breath sounds. No wheezing or rhonchi.   Abdominal:      General: Abdomen is flat. Bowel sounds are normal. There is no distension. "      Palpations: Abdomen is soft. There is no mass.      Tenderness: There is no abdominal tenderness. There is no guarding or rebound.      Hernia: No hernia is present.   Musculoskeletal:         General: Normal range of motion.      Cervical back: Normal range of motion and neck supple. No tenderness.      Right lower leg: No edema.      Left lower leg: No edema.   Lymphadenopathy:      Cervical: No cervical adenopathy.   Skin:     General: Skin is warm and dry.      Findings: No rash.   Neurological:      General: No focal deficit present.      Mental Status: He is alert and oriented to person, place, and time.      Cranial Nerves: No cranial nerve deficit.      Sensory: No sensory deficit.      Motor: No weakness.      Coordination: Coordination normal.      Gait: Gait normal.   Psychiatric:         Mood and Affect: Mood normal.         Behavior: Behavior normal.         Thought Content: Thought content normal.         Judgment: Judgment normal.         Assessment / Plan      Assessment/Plan:   Problem List Items Addressed This Visit    None  Visit Diagnoses       Annual physical exam    -  Primary    Relevant Orders    CBC (No Diff)    Comprehensive Metabolic Panel    Lipid Panel    TSH Rfx On Abnormal To Free T4    Hemoglobin A1c    Vitamin B12    Folate    Vitamin D 25 hydroxy    Testosterone (Free & Total), LC / MS    PSA SCREENING    Paroxysmal atrial fibrillation        Relevant Medications    tadalafil (Cialis) 5 MG tablet    Other Relevant Orders    CBC (No Diff)    Comprehensive Metabolic Panel    Lipid Panel    TSH Rfx On Abnormal To Free T4    Elevated blood pressure reading without diagnosis of hypertension        Anxiety        Relevant Medications    hydrOXYzine (ATARAX) 25 MG tablet    Arthritis        Gastroesophageal reflux disease without esophagitis        Folate deficiency        Relevant Orders    Folate    Erectile dysfunction        Relevant Medications    tadalafil (Cialis) 5 MG tablet     Other Relevant Orders    Testosterone (Free & Total), LC / MS    Chronic fatigue        Relevant Orders    CBC (No Diff)    Comprehensive Metabolic Panel    Lipid Panel    TSH Rfx On Abnormal To Free T4    Hemoglobin A1c    Vitamin B12    Folate    Vitamin D 25 hydroxy    Testosterone (Free & Total), LC / MS    Vitamin D deficiency        Relevant Orders    Vitamin D 25 hydroxy    Alcohol use        Tobacco abuse        Screening for deficiency anemia        Relevant Orders    CBC (No Diff)    Vitamin B12    Folate    Screening for cholesterol level        Relevant Orders    Lipid Panel    Screening for endocrine, metabolic and immunity disorder        Relevant Orders    Comprehensive Metabolic Panel    TSH Rfx On Abnormal To Free T4    Hemoglobin A1c    Testosterone (Free & Total), LC / MS    Screening for colon cancer        Relevant Orders    Cologuard - Stool, Per Rectum    Encounter for screening for malignant neoplasm of lung in current smoker with 30 pack year history or greater        Relevant Orders    CT Chest Low Dose Wo    Screening PSA (prostate specific antigen)        Relevant Orders    PSA SCREENING    Acute URI        Relevant Orders    Covid-19 Antibodies IgM          Assessment & Plan  [id number], continue medications as prescribed and follow-up with cardiology as recommended, sooner if needed  Elevated blood pressure reading-patient binge drank last night during game.  Discouraged binge drinking and alcohol use, follow DASH diet, exercise regimen, push water with electrolytes, avoid nicotine and alcohol as well as other substances and monitor blood pressure and keep a log.  Normal is less than 120/80.  If consistently elevated especially greater than 140/90 needs to follow-up with cardiologist.  Anxiety-initiate hydroxyzine 25 mg to take as needed for anxiety or panic attacks.  Avoid operating heavy machinery until knowing how medication affects you.  Discussed medication can cause  drowsiness.  Chronic fatigue-update labs, ensure to get 7 to 9 hours of sleep at night, plenty of hydration, work on stress management  Alcohol/tobacco use-not interested in quitting tobacco or alcohol use at this time.  Discussed long-term effects of alcohol and tobacco on the body.  Risk of cirrhosis/irreversible damage to the liver.  Risk of lung cancer, cardiovascular disease, and COPD with smoking.   Acute URI-resolving, was around 2 weeks ago.  Still some lingering congestion which he can take some Mucinex or perform saline rinses as needed.  Discussed can order a COVID antibody IgM to see if recent infection, he would like to do this.  Added to lab orders.    Healthcare Maintenance:  Counseling provided based on age appropriate USPSTF guidelines.  Encouraged 150 minutes of exercise total per week for heart health   Recommend balanced healthy diet   Dental visits twice per year, yearly eye exams, yearly skin assessments with dermatology   Colonoscopy every 10 years or Cologuard every 3 years, ordered Cologuard, declined colonoscopy  PSA every 2 years, ordered  AAA screening-not indicated until 65  Lung cancer screening 50+ if >20 pack year history, ordered  Discourage alcohol and tobacco use  Return to lab tomorrow morning fasting 8-12 hours, he ate around 4 hours ago  Declines vaccines today    BMI is >= 25 and <30. (Overweight) The following options were offered after discussion;: exercise counseling/recommendations, nutrition counseling/recommendations, and information on healthy weight added to patient's after visit summary       Markell Chilel voices understanding and acceptance of this advice and will call back with any further questions or concerns. AVS with preventive healthcare tips printed for patient.       Follow Up:   Return in about 1 year (around 9/30/2025) for Annual. Or sooner if HTN persists with cardiology or me       CRYSTAL Valdez  [facility]     Patient  or patient representative verbalized consent for the use of Ambient Listening during the visit with  CRYSTAL Estrella for chart documentation. 9/30/2024  08:57 EDT

## 2024-10-04 DIAGNOSIS — R97.20 ELEVATED PSA: Primary | ICD-10-CM

## 2024-10-04 NOTE — PROGRESS NOTES
Still waiting on a few labs but I will go over what I have currently  Kidney, liver function normal, electrolytes normal  Cholesterol is elevated.  Calculated 10-year heart attack/stroke risk is 17% which is moderately elevated, guidelines recommend a cholesterol medication at night to prevent heart attack stroke.  If you are not interested in cholesterol medication, would recommend following a Mediterranean diet which is heart healthy and try to get 150 minutes of physical activity weekly.  Folate, B12 and vitamin D are low, need to  vitamins and take daily.  Can take 2000 international units of vitamin D3, 1 mg of folic acid, 1000 mcg of vitamin B12 daily over-the-counter  Can recheck these in 3 months.  Prostate antigen is elevated at 5.  Referral to urology placed for further evaluation  Thyroid normal  Still waiting on the rest of your labs and will let you know when these come back  Blood sugar normal

## 2024-10-07 LAB
25(OH)D3+25(OH)D2 SERPL-MCNC: 26.3 NG/ML (ref 30–100)
ALBUMIN SERPL-MCNC: 4.6 G/DL (ref 3.5–5.2)
ALBUMIN/GLOB SERPL: 2.1 G/DL
ALP SERPL-CCNC: 71 U/L (ref 39–117)
ALT SERPL-CCNC: 26 U/L (ref 1–41)
AST SERPL-CCNC: 24 U/L (ref 1–40)
BILIRUB SERPL-MCNC: 0.6 MG/DL (ref 0–1.2)
BUN SERPL-MCNC: 13 MG/DL (ref 8–23)
BUN/CREAT SERPL: 17.8 (ref 7–25)
CALCIUM SERPL-MCNC: 9.4 MG/DL (ref 8.6–10.5)
CHLORIDE SERPL-SCNC: 102 MMOL/L (ref 98–107)
CHOLEST SERPL-MCNC: 217 MG/DL (ref 0–200)
CO2 SERPL-SCNC: 27.3 MMOL/L (ref 22–29)
CREAT SERPL-MCNC: 0.73 MG/DL (ref 0.76–1.27)
EGFRCR SERPLBLD CKD-EPI 2021: 102.9 ML/MIN/1.73
ERYTHROCYTE [DISTWIDTH] IN BLOOD BY AUTOMATED COUNT: 12.3 % (ref 12.3–15.4)
FOLATE SERPL-MCNC: 3.97 NG/ML (ref 4.78–24.2)
GLOBULIN SER CALC-MCNC: 2.2 GM/DL
GLUCOSE SERPL-MCNC: 96 MG/DL (ref 65–99)
HBA1C MFR BLD: 5.3 % (ref 4.8–5.6)
HCT VFR BLD AUTO: 50.5 % (ref 37.5–51)
HDLC SERPL-MCNC: 71 MG/DL (ref 40–60)
HGB BLD-MCNC: 17.2 G/DL (ref 13–17.7)
LABORATORY COMMENT REPORT: NORMAL
LDLC SERPL CALC-MCNC: 131 MG/DL (ref 0–100)
Lab: NORMAL
MCH RBC QN AUTO: 33 PG (ref 26.6–33)
MCHC RBC AUTO-ENTMCNC: 34.1 G/DL (ref 31.5–35.7)
MCV RBC AUTO: 96.9 FL (ref 79–97)
PATHOLOGIST NAME: NORMAL
PLATELET # BLD AUTO: 232 10*3/MM3 (ref 140–450)
POTASSIUM SERPL-SCNC: 4.4 MMOL/L (ref 3.5–5.2)
PROT SERPL-MCNC: 6.8 G/DL (ref 6–8.5)
PSA SERPL-MCNC: 5.25 NG/ML (ref 0–4)
RBC # BLD AUTO: 5.21 10*6/MM3 (ref 4.14–5.8)
SARS-COV-2 IGM SERPL IA-ACNC: 0.19 COI
SARS-COV-2 IGM SERPL QL IA: NEGATIVE
SODIUM SERPL-SCNC: 141 MMOL/L (ref 136–145)
TESTOST FREE SERPL-MCNC: 11.2 PG/ML (ref 6.6–18.1)
TESTOST SERPL-MCNC: 465.6 NG/DL (ref 264–916)
TRIGL SERPL-MCNC: 84 MG/DL (ref 0–150)
TSH SERPL DL<=0.005 MIU/L-ACNC: 0.77 UIU/ML (ref 0.27–4.2)
VIT B12 SERPL-MCNC: 250 PG/ML (ref 211–946)
VLDLC SERPL CALC-MCNC: 15 MG/DL (ref 5–40)
WBC # BLD AUTO: 6.42 10*3/MM3 (ref 3.4–10.8)

## 2024-10-10 ENCOUNTER — OFFICE VISIT (OUTPATIENT)
Dept: UROLOGY | Facility: CLINIC | Age: 64
End: 2024-10-10
Payer: COMMERCIAL

## 2024-10-10 VITALS
HEART RATE: 72 BPM | BODY MASS INDEX: 26.01 KG/M2 | SYSTOLIC BLOOD PRESSURE: 130 MMHG | TEMPERATURE: 98.4 F | OXYGEN SATURATION: 97 % | HEIGHT: 72 IN | WEIGHT: 192 LBS | DIASTOLIC BLOOD PRESSURE: 76 MMHG

## 2024-10-10 DIAGNOSIS — R97.20 ELEVATED PROSTATE SPECIFIC ANTIGEN (PSA): Primary | ICD-10-CM

## 2024-10-10 DIAGNOSIS — R39.9 LOWER URINARY TRACT SYMPTOMS (LUTS): ICD-10-CM

## 2024-10-10 PROBLEM — R03.0 ELEVATED BLOOD-PRESSURE READING WITHOUT DIAGNOSIS OF HYPERTENSION: Status: ACTIVE | Noted: 2024-10-10

## 2024-10-10 PROBLEM — K21.9 GASTROESOPHAGEAL REFLUX DISEASE: Status: ACTIVE | Noted: 2024-10-10

## 2024-10-10 NOTE — PROGRESS NOTES
"       Office Visit Elevated PSA     Patient Name: Markell Chilel  : 1960   MRN: [id number]     Chief Complaint: Elevated PSA.    Chief Complaint   Patient presents with    Establish Care     Elevated PSA         Referring Provider: Tenisha Fuchs APRN    History of Present Illness: Mr. Markell Chilel is a 64 y.o.  male who presents with an elevated PSA to   PSA          10/1/2024    08:45   PSA   PSA 5.250    .      Patient complains of daytime frequency, intermittency, sensation of incomplete bladder emptying, urgency, and weak urinary stream.  His IPSS score is 10. He is overall mostly satisfied with his urinary symptoms. No prior PSAs to review.      Patient denies fevers, chills, nausea, vomiting, constipation, or flank pain, shortness of breath, leg swelling, calf pain or bone pain.    Past  history is negative for BPH, frequent UTIs, gross hematuria, stones or other renal diseases.     Subjective      Review of System: ROS reviewed by me and is negative unless otherwise noted in HPI.      Past Medical History:   Past Medical History:   Diagnosis Date    A-fib     after COVID vaccine    Anxiety     Arthritis     Claustrophobia     COVID-19 vaccine series completed     moderna    GERD (gastroesophageal reflux disease)     Hx of exercise stress test     IN [place]-\"IT WAS NORMAL\"    Panic attacks     Rotator cuff syndrome     right    Squamous cell cancer of skin of left temple     Wears contact lenses        Past Surgical History:   Past Surgical History:   Procedure Laterality Date    COLONOSCOPY      ENDOSCOPY      ENDOSCOPY N/A 2021    Procedure: ESOPHAGOGASTRODUODENOSCOPY WITH BIOPSY;  Surgeon: Kamilla Bruner MD;  Location: [facility] ENDOSCOPY;  Service: Gastroenterology;  Laterality: N/A;    SHOULDER ARTHROSCOPY Right 2018    Procedure: shoulder arthroscopy, right,  subacromial decompression, distal clavicle excision and acromioplasty, mini-open rotator cuff " repair;  Surgeon: Camilo Miller MD;  Location: [facility];  Service: Orthopedics    SHOULDER SURGERY Left     WIDE EXCISION HEAD/NECK LESION/MASS Left 5/24/2021    Procedure: WIDE EXCISION SQUAMOUS CELL CARCINOMA LEFT TEMPLE WITH FROZEN SECTION;  Surgeon: Kamilla Bruner MD;  Location: [facility];  Service: General;  Laterality: Left;    WISDOM TOOTH EXTRACTION       Family History:   Family History   Problem Relation Age of Onset    Arthritis Father     Stroke Mother     Hypertension Mother     Prostate cancer Neg Hx     Kidney cancer Neg Hx      Has no family history of prostate cancer.    Social History:   Social History     Socioeconomic History    Marital status: Single   Tobacco Use    Smoking status: Every Day     Current packs/day: 1.00     Average packs/day: 1 pack/day for 37.8 years (37.8 ttl pk-yrs)     Types: Cigars, Cigarettes     Start date: 1981     Last attempt to quit: 2004     Passive exposure: Past    Smokeless tobacco: Never   Vaping Use    Vaping status: Never Used   Substance and Sexual Activity    Alcohol use: Yes     Alcohol/week: 14.0 standard drinks of alcohol     Types: 14 Cans of beer per week     Comment: 14 beers weekly    Drug use: Never    Sexual activity: Yes     Partners: Female     Medications:     Current Outpatient Medications:     albuterol sulfate  (90 Base) MCG/ACT inhaler, Inhale 2 puffs Every 4 (Four) Hours As Needed for Wheezing or Shortness of Air., Disp: 18 g, Rfl: 0    flecainide (TAMBOCOR) 50 MG tablet, Take 1 tablet by mouth Every 12 (Twelve) Hours., Disp: 180 tablet, Rfl: 3    HYDROcodone-acetaminophen (NORCO) 7.5-325 MG per tablet, Take 1 tablet by mouth Every 8 (Eight) Hours As Needed for Moderate Pain., Disp: , Rfl:     hydrOXYzine (ATARAX) 25 MG tablet, Take 1 tablet by mouth 3 (Three) Times a Day As Needed for Anxiety., Disp: 90 tablet, Rfl: 0    metoprolol succinate XL (TOPROL-XL) 25 MG 24 hr tablet, Take 1 tablet by mouth Daily., Disp: 90 tablet,  "Rfl: 3    omeprazole (priLOSEC) 40 MG capsule, Take 1 capsule by mouth Daily., Disp: 90 capsule, Rfl: 3    tadalafil (Cialis) 5 MG tablet, Take 1 tablet by mouth Daily As Needed for Erectile Dysfunction., Disp: 10 tablet, Rfl: 0    Allergies:   Allergies   Allergen Reactions    Penicillins Hives       Objective     Physical Exam:   Vital Signs:   Vitals:    10/10/24 1011   BP: 130/76   Pulse: 72   Temp: 98.4 °F (36.9 °C)   SpO2: 97%   Weight: 87.1 kg (192 lb)   Height: 182.9 cm (72.01\")     Body mass index is 26.03 kg/m².   Physical Exam  Vitals and nursing note reviewed. Exam conducted with a chaperone present.   Constitutional:       General: He is not in acute distress.     Appearance: Normal appearance. He is not ill-appearing.   HENT:      Head: Normocephalic and atraumatic.   Pulmonary:      Effort: Pulmonary effort is normal.   Genitourinary:     Penis: Normal.       Testes: Normal.      Prostate: Normal. Not enlarged, not tender and no nodules present.      Rectum: No external hemorrhoid. Normal anal tone.   Skin:     General: Skin is warm and dry.   Neurological:      General: No focal deficit present.      Mental Status: He is alert and oriented to person, place, and time.   Psychiatric:         Mood and Affect: Mood normal.         Behavior: Behavior normal.     Labs  Brief Urine Lab Results       None          Lab Results   Component Value Date    PSA 5.250 (H) 10/01/2024     Images:   No Images in the past 120 days found.    PVR 0 ml.     Assessment / Plan      Assessment  Mr. Chilel is a 64 y.o.  male who presents with elevated PSA.  This is a new diagnosis of uncertain clinical prognosis.  PVR 0 ml.     I explained to the patient that an elevated PSA is a marker of risk of prostate cancer and a 4K score would be the next step in evaluation.  The 4K score will help determine the probability of finding an aggressive prostate cancer if we were to biopsy.      Mr. Chilel is agreeable to this " plan. Questions/concerns addressed.  Will have him follow up with me in 2 weeks to discuss results of 4K score and plan.      Diagnoses and all orders for this visit:    1. Elevated prostate specific antigen (PSA) (Primary)    2. Lower urinary tract symptoms (LUTS)    Follow Up:   Return in about 2 weeks (around 10/24/2024) for Next scheduled follow up, with Ann-Marie to review 4K results .    CRYSTAL Gtz, MSN, FNP-C  [facility] [place]

## 2024-10-11 ENCOUNTER — PATIENT ROUNDING (BHMG ONLY) (OUTPATIENT)
Dept: UROLOGY | Facility: CLINIC | Age: 64
End: 2024-10-11
Payer: COMMERCIAL

## 2024-10-15 ENCOUNTER — HOSPITAL ENCOUNTER (OUTPATIENT)
Dept: CT IMAGING | Facility: HOSPITAL | Age: 64
Discharge: HOME | End: 2024-10-15
Admitting: NURSE PRACTITIONER
Payer: COMMERCIAL

## 2024-10-15 DIAGNOSIS — F17.200 ENCOUNTER FOR SCREENING FOR MALIGNANT NEOPLASM OF LUNG IN CURRENT SMOKER WITH 30 PACK YEAR HISTORY OR GREATER: ICD-10-CM

## 2024-10-15 DIAGNOSIS — R93.89 ABNORMAL CT OF THE CHEST: ICD-10-CM

## 2024-10-15 DIAGNOSIS — Z72.0 TOBACCO ABUSE: ICD-10-CM

## 2024-10-15 DIAGNOSIS — Z12.2 ENCOUNTER FOR SCREENING FOR MALIGNANT NEOPLASM OF LUNG IN CURRENT SMOKER WITH 30 PACK YEAR HISTORY OR GREATER: ICD-10-CM

## 2024-10-15 DIAGNOSIS — R91.1 SOLID NODULE OF LUNG GREATER THAN 8 MM IN DIAMETER: Primary | ICD-10-CM

## 2024-10-15 PROCEDURE — 71271 CT THORAX LUNG CANCER SCR C-: CPT

## 2024-10-15 RX ORDER — NICOTINE 21 MG/24HR
1 PATCH, TRANSDERMAL 24 HOURS TRANSDERMAL EVERY 24 HOURS
Qty: 42 PATCH | Refills: 0 | Status: SHIPPED | OUTPATIENT
Start: 2024-10-15 | End: 2024-11-26

## 2024-10-15 RX ORDER — NICOTINE 21 MG/24HR
1 PATCH, TRANSDERMAL 24 HOURS TRANSDERMAL EVERY 24 HOURS
Qty: 14 PATCH | Refills: 0 | Status: SHIPPED | OUTPATIENT
Start: 2024-10-15 | End: 2024-10-29

## 2024-10-17 ENCOUNTER — TELEPHONE (OUTPATIENT)
Dept: INTERNAL MEDICINE | Facility: CLINIC | Age: 64
End: 2024-10-17
Payer: COMMERCIAL

## 2024-10-17 DIAGNOSIS — F10.90 ALCOHOL USE DISORDER: Primary | ICD-10-CM

## 2024-10-17 RX ORDER — ACAMPROSATE CALCIUM 333 MG/1
666 TABLET, DELAYED RELEASE ORAL 3 TIMES DAILY
Qty: 180 TABLET | Refills: 0 | COMMUNITY
Start: 2024-10-17 | End: 2024-10-17

## 2024-10-17 NOTE — PROGRESS NOTES
The medication for alcohol use disorder is called naltrexone, it is not supposed to be taken with opiate pain medication because it blocks the pain medication.  Is he still taking opiates from pain management?  There is another drug but it is only supposed to be prescribed after abstinence of alcohol to prevent recurrent use.  It is okay for him to cut back slowly on the alcohol, complete discontinuation can cause withdrawals  I want him to immediately quit smoking when he starts the patches

## 2024-10-17 NOTE — TELEPHONE ENCOUNTER
"Caller: Markell Chilel \"Alec\"    Relationship: Self    Best call back number:  [phone] (Mobile)     What medication are you requesting: MEDICATION TO HELP HIM QUIT DRINKING     What are your current symptoms:  DRINKING     If a prescription is needed, what is your preferred pharmacy and phone number:    [facility]     Additional notes:  PATIENT WANTS TO KNOW AT HIS AGE IS IT SAFE FOR HIM TO QUIT SMOKING AND DRINKING AT THE SAME TIME BECAUSE HE HAD AFIB A FEW YEARS AGO     HE WILL NOT START THIS UNTIL MONDAY     PLEASE CALL AND ADVISE         "

## 2024-10-17 NOTE — Clinical Note
I think he's still taking opiates and if so cannot send naltrexone in for him as it blocks the pain medication. Will have him cut back by 50% then once he completely stops can start a medication to prevent recurrent/reuse. Ok to completely stop smoking once start patches.

## 2024-10-17 NOTE — TELEPHONE ENCOUNTER
The medication that is used for alcohol use disorder called naltrexone blocks opiates so his pain medication would not work.  I would advise him cut back on alcohol use by 50%, when he starts the patches completely stop smoking altogether.  Then slowly cut back on the alcohol use every week.  When he is down to 1-3 beers per week I can prescribe acamprosate. This med does not block opiates.

## 2024-10-18 RX ORDER — NALTREXONE HYDROCHLORIDE 50 MG/1
50 TABLET, FILM COATED ORAL DAILY
Qty: 30 TABLET | Refills: 2 | Status: SHIPPED | OUTPATIENT
Start: 2024-10-18

## 2024-10-18 NOTE — TELEPHONE ENCOUNTER
"Relay     \"The medication that is used for alcohol use disorder called naltrexone blocks opiates so his pain medication would not work.  Tenisha would advise him to cut back on alcohol use by 50%, when he starts the patches completely stop smoking altogether.  Then slowly cut back on the alcohol use every week.  When he is down to 1-3 beers per week Tenisha can prescribe acamprosate. This med does not block opiates. \"                  "

## 2024-10-18 NOTE — TELEPHONE ENCOUNTER
"Name: Markell Chilel \"Alec\"      Relationship: Self      Best Callback Number: [phone]       HUB PROVIDED THE RELAY MESSAGE FROM THE OFFICE      PATIENT: HAS FURTHER QUESTIONS AND WOULD LIKE A CALL BACK AT THE FOLLOWING PHONE [phone]    ADDITIONAL INFORMATION: PATIENT ADVISES THAT HE IS OFF WORK NOW FOR ABOUT A MONTH AND IS NOT TAKING ANY OPIATES. PATIENT WANTS TO KNOW SINCE HE IS NOT TAKING ANY OPIATES, COULD HE BE PUT ON THE MEDICATION FOR ALCOHOL. PLEASE CALL BACK TO DISCUSS.    "

## 2024-10-18 NOTE — TELEPHONE ENCOUNTER
"Attempted contacting Pt no answer.     Relay     \"Tenisha sent in naltrexone and advises that you do not take any pain medication while on it. \"                  "

## 2024-10-18 NOTE — TELEPHONE ENCOUNTER
Attempted to contact, unable to leave detailed VM per NARCISA. Asked that patient return call to discuss. Left direct number.

## 2024-10-24 ENCOUNTER — OFFICE VISIT (OUTPATIENT)
Dept: UROLOGY | Facility: CLINIC | Age: 64
End: 2024-10-24
Payer: COMMERCIAL

## 2024-10-24 VITALS
HEIGHT: 72 IN | WEIGHT: 195.2 LBS | HEART RATE: 78 BPM | BODY MASS INDEX: 26.44 KG/M2 | TEMPERATURE: 97 F | OXYGEN SATURATION: 98 % | RESPIRATION RATE: 14 BRPM

## 2024-10-24 DIAGNOSIS — R97.20 ELEVATED PROSTATE SPECIFIC ANTIGEN (PSA): Primary | ICD-10-CM

## 2024-10-24 DIAGNOSIS — R39.9 LOWER URINARY TRACT SYMPTOMS (LUTS): ICD-10-CM

## 2024-10-24 PROBLEM — G89.29 CHRONIC PAIN: Status: ACTIVE | Noted: 2024-08-16

## 2024-10-24 RX ORDER — HYDROCODONE BITARTRATE AND ACETAMINOPHEN 7.5; 325 MG/1; MG/1
1 TABLET ORAL EVERY 8 HOURS PRN
COMMUNITY
Start: 2024-10-21

## 2024-10-24 NOTE — PROGRESS NOTES
"       Office Visit Established Male Patient     Patient Name: Markell Chilel  : 1960   MRN: [id number]     Chief Complaint:   Chief Complaint   Patient presents with    elevated PSA, review 4K     History of Present Illness: Mr. Markell Chilel is a 64 y.o. male who presents today with history of elevated PSA to 5.25 ng/mL.  IPSS at his last visit was 10.  He is here today to review his 4K score which is 9.9 which correlates with a 9.6% risk of having an aggressive prostate cancer if biopsy were to be performed.  No new or worsening urologic complaints today.      Subjective      Review of System: ROS reviewed by me and is negative unless otherwise noted in HPI.      Past Medical History:   Past Medical History:   Diagnosis Date    A-fib     after COVID vaccine    Anxiety     Arthritis     Claustrophobia     COVID-19 vaccine series completed     moderna    GERD (gastroesophageal reflux disease)     Hx of exercise stress test     IN [place]-\"IT WAS NORMAL\"    Panic attacks     Rotator cuff syndrome     right    Squamous cell cancer of skin of left temple     Wears contact lenses        Past Surgical History:   Past Surgical History:   Procedure Laterality Date    COLONOSCOPY      ENDOSCOPY      ENDOSCOPY N/A 2021    Procedure: ESOPHAGOGASTRODUODENOSCOPY WITH BIOPSY;  Surgeon: Kamilla Bruner MD;  Location: [facility] ENDOSCOPY;  Service: Gastroenterology;  Laterality: N/A;    SHOULDER ARTHROSCOPY Right 2018    Procedure: shoulder arthroscopy, right,  subacromial decompression, distal clavicle excision and acromioplasty, mini-open rotator cuff repair;  Surgeon: Camilo Miller MD;  Location: [facility] OR;  Service: Orthopedics    SHOULDER SURGERY Left     WIDE EXCISION HEAD/NECK LESION/MASS Left 2021    Procedure: WIDE EXCISION SQUAMOUS CELL CARCINOMA LEFT TEMPLE WITH FROZEN SECTION;  Surgeon: Kamilla Bruner MD;  Location: [facility] OR;  Service: General;  Laterality: Left;    " WISDOM TOOTH EXTRACTION         Family History:   Family History   Problem Relation Age of Onset    Arthritis Father     Stroke Mother     Hypertension Mother     Prostate cancer Neg Hx     Kidney cancer Neg Hx        Social History:   Social History     Socioeconomic History    Marital status: Single   Tobacco Use    Smoking status: Every Day     Current packs/day: 1.00     Average packs/day: 1 pack/day for 37.8 years (37.8 ttl pk-yrs)     Types: Cigars, Cigarettes     Start date: 1981     Last attempt to quit: 2004     Passive exposure: Past    Smokeless tobacco: Never   Vaping Use    Vaping status: Never Used   Substance and Sexual Activity    Alcohol use: Yes     Alcohol/week: 14.0 standard drinks of alcohol     Types: 14 Cans of beer per week     Comment: 14 beers weekly    Drug use: Never    Sexual activity: Yes     Partners: Female       Medications:     Current Outpatient Medications:     flecainide (TAMBOCOR) 50 MG tablet, Take 1 tablet by mouth Every 12 (Twelve) Hours., Disp: 180 tablet, Rfl: 3    HYDROcodone-acetaminophen (NORCO) 7.5-325 MG per tablet, Take 1 tablet by mouth Every 8 (Eight) Hours As Needed for Moderate Pain., Disp: , Rfl:     nicotine polacrilex (RA Nicotine Gum) 4 MG gum, Chew 1 each As Needed for Smoking Cessation., Disp: 50 each, Rfl: 1    omeprazole (priLOSEC) 40 MG capsule, Take 1 capsule by mouth Daily., Disp: 90 capsule, Rfl: 3    tadalafil (Cialis) 5 MG tablet, Take 1 tablet by mouth Daily As Needed for Erectile Dysfunction., Disp: 10 tablet, Rfl: 0    albuterol sulfate  (90 Base) MCG/ACT inhaler, Inhale 2 puffs Every 4 (Four) Hours As Needed for Wheezing or Shortness of Air. (Patient not taking: Reported on 10/24/2024), Disp: 18 g, Rfl: 0    hydrOXYzine (ATARAX) 25 MG tablet, Take 1 tablet by mouth 3 (Three) Times a Day As Needed for Anxiety. (Patient not taking: Reported on 10/24/2024), Disp: 90 tablet, Rfl: 0    metoprolol succinate XL (TOPROL-XL) 25 MG 24 hr tablet,  "Take 1 tablet by mouth Daily. (Patient not taking: Reported on 10/24/2024), Disp: 90 tablet, Rfl: 3    nicotine (NICODERM CQ) 7 MG/24HR patch, Place 1 patch on the skin as directed by provider Daily for 14 days. (Patient not taking: Reported on 10/24/2024), Disp: 14 patch, Rfl: 0    Allergies:   Allergies   Allergen Reactions    Penicillins Hives       Objective     Physical Exam:   Vital Signs:   Vitals:    10/24/24 1042   Pulse: 78   Resp: 14   Temp: 97 °F (36.1 °C)   TempSrc: Temporal   SpO2: 98%   Weight: 88.5 kg (195 lb 3.2 oz)   Height: 182.9 cm (72.01\")     Body mass index is 26.47 kg/m².   Physical Exam  Vitals and nursing note reviewed.   Constitutional:       General: He is not in acute distress.     Appearance: Normal appearance. He is not ill-appearing.   HENT:      Head: Normocephalic and atraumatic.   Pulmonary:      Effort: Pulmonary effort is normal.   Skin:     General: Skin is warm and dry.   Neurological:      General: No focal deficit present.      Mental Status: He is alert and oriented to person, place, and time.   Psychiatric:         Mood and Affect: Mood normal.         Behavior: Behavior normal.      Labs  Brief Urine Lab Results       None          Lab Results   Component Value Date    GLUCOSE 96 10/01/2024    CALCIUM 9.4 10/01/2024     10/01/2024    K 4.4 10/01/2024    CO2 27.3 10/01/2024     10/01/2024    BUN 13 10/01/2024    CREATININE 0.73 (L) 10/01/2024    EGFRIFNONA 87 11/29/2018    BCR 17.8 10/01/2024    ANIONGAP 13.1 07/12/2022     Lab Results   Component Value Date    WBC 6.42 10/01/2024    HGB 17.2 10/01/2024    HCT 50.5 10/01/2024    MCV 96.9 10/01/2024     10/01/2024     Radiographic Studies  CT Chest Low Dose Cancer Screening WO  Result Date: 10/15/2024  2 left upper lobe nodules, largest measuring 11 mm. Lung RADS category 4A.  RECOMMENDATIONS: PET/CT is recommended.    Images were reviewed, interpreted, and dictated by Dr. Ab Meeks MD Transcribed by " Leah Lyons PA-C.  This report was signed and finalized on 10/15/2024 12:02 PM by Ab Meeks MD.      I have reviewed the above labs and imaging.     Assessment / Plan      Assessment/Plan: Mr. Markell Chilel is a 64 y.o. male who presents today with history of elevated PSA to 5.25 ng/mL.  IPSS at his last visit was 10.  He is here today to review his 4K score which is 9.9 which correlates with a 9.6% risk of having an aggressive prostate cancer if biopsy were to be performed.     Shared decision making discussion had today with recommendation to recheck PSA in 6 months to see if it has elevated or has remained stable.  Or to proceed with prostate MRI to see if any obvious lesions are present within the prostate.  We discussed that a negative prostate MRI does not exclude microscopic disease.  He wishes to proceed with prostate MRI and if negative would like to proceed with BPH workup which would include cystoscopy and uroflow.     Questions/concerns addressed to patient's satisfaction today.  Will follow up in 4-6 weeks to review prostate MRI results or earlier if he is able to complete his prostate MRI done sooner.      Diagnoses and all orders for this visit:    1. Elevated prostate specific antigen (PSA) (Primary)  -     MRI Prostate With & Without Contrast; Future    2. Lower urinary tract symptoms (LUTS)    Follow Up:   Return in about 6 weeks (around 12/5/2024) for Next scheduled follow up, with Ann-Marie to review prostate MRI.    CRYSTAL Gtz, MSN, FNP-C  [facility] [place]

## 2024-10-31 ENCOUNTER — HOSPITAL ENCOUNTER (OUTPATIENT)
Facility: HOSPITAL | Age: 64
Discharge: HOME | End: 2024-10-31
Payer: COMMERCIAL

## 2024-10-31 DIAGNOSIS — R93.89 ABNORMAL CT OF THE CHEST: ICD-10-CM

## 2024-10-31 DIAGNOSIS — R91.1 SOLID NODULE OF LUNG GREATER THAN 8 MM IN DIAMETER: ICD-10-CM

## 2024-10-31 DIAGNOSIS — Z72.0 TOBACCO ABUSE: ICD-10-CM

## 2024-10-31 LAB — GLUCOSE BLDC GLUCOMTR-MCNC: 121 MG/DL (ref 70–130)

## 2024-10-31 PROCEDURE — A9552 F18 FDG: HCPCS | Performed by: NURSE PRACTITIONER

## 2024-10-31 PROCEDURE — 82948 REAGENT STRIP/BLOOD GLUCOSE: CPT

## 2024-10-31 PROCEDURE — 78815 PET IMAGE W/CT SKULL-THIGH: CPT

## 2024-10-31 PROCEDURE — 0 FLUDEOXYGLUCOSE F18 SOLUTION: Performed by: NURSE PRACTITIONER

## 2024-10-31 RX ADMIN — FLUDEOXYGLUCOSE F18 1 DOSE: 300 INJECTION INTRAVENOUS at 08:19

## 2024-11-04 DIAGNOSIS — R91.1 LEFT UPPER LOBE PULMONARY NODULE: Primary | ICD-10-CM

## 2024-11-11 ENCOUNTER — TELEPHONE (OUTPATIENT)
Dept: INTERNAL MEDICINE | Facility: CLINIC | Age: 64
End: 2024-11-11

## 2024-11-11 NOTE — TELEPHONE ENCOUNTER
"  Caller: Markell Chilel \"Alec\"    Relationship: Self    Best call back number: [phone]    What form or medical record are you requesting: PET SCAN     Who is requesting this form or medical record from you: UROLOGIST    How would you like to receive the form or medical records (pick-up, mail, fax):   PT NOT SURE FAX NUMBER BUT UROLOGIST IN AT [facility] IN [place].  Timeframe paperwork needed: ASAP  "

## 2024-11-15 ENCOUNTER — TELEPHONE (OUTPATIENT)
Dept: UROLOGY | Facility: CLINIC | Age: 64
End: 2024-11-15

## 2024-11-15 ENCOUNTER — TELEPHONE (OUTPATIENT)
Dept: UROLOGY | Facility: CLINIC | Age: 64
End: 2024-11-15
Payer: COMMERCIAL

## 2024-11-15 NOTE — TELEPHONE ENCOUNTER
"    Caller: Markell Chilel \"Alec\"    Relationship to patient: Self    Best call back number: [phone]    Patient is needing: RECEIVED A CALL FROM PT. HE CALLED ASKING IF DEE WOULD WANT THE MRI AFTER COMPLETING THE PET SCAN IN CHART. INFORMED PT TO GO AHEAD AND GET SCHEDULED FOR MRI JUST CASE OUR PROVIDER SAYS THAT SHE WILL NEED THE MRI AS WELL.   "

## 2024-11-15 NOTE — TELEPHONE ENCOUNTER
"Caller: Markell Chilel \"Alec\"    Relationship to patient: Self    Best call back number: [phone]    Patient is needing: RECEIVED A CALL FROM PT. HE CALLED ASKING IF DEE WOULD WANT THE MRI AFTER COMPLETING THE PET SCAN IN CHART. INFORMED PT TO GO AHEAD AND GET SCHEDULED FOR MRI JUST CASE OUR PROVIDER SAYS THAT SHE WILL NEED THE MRI AS WELL.   " Your neurologist will call you for an appt

## 2024-11-22 ENCOUNTER — TELEPHONE (OUTPATIENT)
Dept: UROLOGY | Facility: CLINIC | Age: 64
End: 2024-11-22
Payer: COMMERCIAL

## 2024-11-22 NOTE — TELEPHONE ENCOUNTER
Hub staff attempted to follow warm transfer process and was unsuccessful     Caller: SHAUN    Relationship to patient: [facility]    Best call back number: [phone]    Patient is needing: CALLING TO GET A MISSING DIAGNOSIS CODE FOR PATIENT. PLEASE REACH BACK WHEN AVAILABLE.

## 2024-11-26 NOTE — TELEPHONE ENCOUNTER
SHAUN [facility] CALLED BACK NEEDING MISSING DX.    HUB ATTEMPTED TO WARM TRANSFER TO CLINICAL AND WAS UNSUCCESSFUL. PLEASE CALL BACK  [phone].

## 2024-11-27 ENCOUNTER — TELEPHONE (OUTPATIENT)
Dept: UROLOGY | Facility: CLINIC | Age: 64
End: 2024-11-27
Payer: COMMERCIAL

## 2024-12-04 ENCOUNTER — TELEPHONE (OUTPATIENT)
Dept: UROLOGY | Facility: CLINIC | Age: 64
End: 2024-12-04
Payer: COMMERCIAL

## 2024-12-04 NOTE — TELEPHONE ENCOUNTER
----- Message from Cielo FRYE sent at 11/19/2024  5:26 PM EST -----  Regarding: mri decision  Call patient and ask if he is getting MRI or Prostate bx

## 2024-12-04 NOTE — TELEPHONE ENCOUNTER
I called and spoke to patient again about MRI and he has agreed to schedule MRI since PET scan is not detailed enough. I gave him scheduling number and he will call after he gets MRI scheduled.

## 2024-12-04 NOTE — TELEPHONE ENCOUNTER
"Provider: YAMILE SLATER    Caller: Markell Chilel \"Alec\"    Relationship to Patient: Self      Reason for Call: PT RETURNED CALL TO OFFICE.  HE NEEDS TO CONFIRM IF THE MRI IS NEEDED, OR IF THE PET SCAN GAVE THE RESULTS THAT WERE NEEDED.      PLEASE CHECK WITH YAMILE AND CALL PT TO CONFIRM.  HE DOES NT WANT MRI IF IT IS NOT NECESSARY.    PLEASE CALL TO CONFIRM, OK TO LVM.  "

## 2024-12-22 DIAGNOSIS — F41.9 ANXIETY: ICD-10-CM

## 2024-12-23 RX ORDER — HYDROXYZINE HYDROCHLORIDE 25 MG/1
25 TABLET, FILM COATED ORAL 3 TIMES DAILY PRN
Qty: 90 TABLET | Refills: 1 | Status: SHIPPED | OUTPATIENT
Start: 2024-12-23

## 2025-01-12 ENCOUNTER — HOSPITAL ENCOUNTER (OUTPATIENT)
Dept: MRI IMAGING | Facility: HOSPITAL | Age: 65
Discharge: HOME | End: 2025-01-12
Admitting: NURSE PRACTITIONER
Payer: COMMERCIAL

## 2025-01-12 DIAGNOSIS — R97.20 ELEVATED PROSTATE SPECIFIC ANTIGEN (PSA): ICD-10-CM

## 2025-01-12 PROCEDURE — 25510000002 GADOBENATE DIMEGLUMINE 529 MG/ML SOLUTION: Performed by: NURSE PRACTITIONER

## 2025-01-12 PROCEDURE — 72197 MRI PELVIS W/O & W/DYE: CPT

## 2025-01-12 PROCEDURE — A9577 INJ MULTIHANCE: HCPCS | Performed by: NURSE PRACTITIONER

## 2025-01-12 RX ADMIN — GADOBENATE DIMEGLUMINE 18 ML: 529 INJECTION, SOLUTION INTRAVENOUS at 12:52

## 2025-01-14 ENCOUNTER — OFFICE VISIT (OUTPATIENT)
Dept: UROLOGY | Facility: CLINIC | Age: 65
End: 2025-01-14
Payer: COMMERCIAL

## 2025-01-14 VITALS
BODY MASS INDEX: 27.85 KG/M2 | WEIGHT: 205.4 LBS | RESPIRATION RATE: 15 BRPM | TEMPERATURE: 97.5 F | OXYGEN SATURATION: 97 % | HEART RATE: 83 BPM

## 2025-01-14 DIAGNOSIS — R39.9 LOWER URINARY TRACT SYMPTOMS (LUTS): ICD-10-CM

## 2025-01-14 DIAGNOSIS — R97.20 ELEVATED PROSTATE SPECIFIC ANTIGEN (PSA): Primary | ICD-10-CM

## 2025-01-14 RX ORDER — NALTREXONE HYDROCHLORIDE 50 MG/1
1 TABLET, FILM COATED ORAL DAILY
COMMUNITY

## 2025-01-14 RX ORDER — METRONIDAZOLE 10 MG/G
GEL TOPICAL
COMMUNITY

## 2025-01-14 NOTE — PROGRESS NOTES
"       Office Visit Established Male Patient     Patient Name: Markell Chilel  : 1960   MRN: [id number]     Chief Complaint:   Chief Complaint   Patient presents with    Follow-up    Results    Elevated PSA     History of Present Illness: Mr. Markell Chilel is a 64 y.o. male who presents today with history of elevated PSA to 5.25 ng/mL and an approximately 10% risk of having an aggressive prostate cancer if biopsy were to be performed.  IPSS of 10 at his previous visit with no new complaints or urologic concerns.      He is here today to review his prostate MRI results which revealed a PIRADS 4 lesion in the left peripheral zone.  PSA density of 0.21.  No new or worsening urologic complaints at this time.      Subjective      Review of System: ROS reviewed by me and is negative unless otherwise noted in HPI.      Past Medical History:   Past Medical History:   Diagnosis Date    A-fib     after COVID vaccine    Anxiety     Arthritis     Claustrophobia     COVID-19 vaccine series completed     moderna    GERD (gastroesophageal reflux disease)     Hx of exercise stress test     IN [place]-\"IT WAS NORMAL\"    Panic attacks     Rotator cuff syndrome     right    Squamous cell cancer of skin of left temple     Wears contact lenses        Past Surgical History:   Past Surgical History:   Procedure Laterality Date    COLONOSCOPY      ENDOSCOPY      ENDOSCOPY N/A 2021    Procedure: ESOPHAGOGASTRODUODENOSCOPY WITH BIOPSY;  Surgeon: Kamilla Bruner MD;  Location: [facility] ENDOSCOPY;  Service: Gastroenterology;  Laterality: N/A;    SHOULDER ARTHROSCOPY Right 2018    Procedure: shoulder arthroscopy, right,  subacromial decompression, distal clavicle excision and acromioplasty, mini-open rotator cuff repair;  Surgeon: Camilo Miller MD;  Location: [facility] OR;  Service: Orthopedics    SHOULDER SURGERY Left     WIDE EXCISION HEAD/NECK LESION/MASS Left 2021    Procedure: WIDE EXCISION " SQUAMOUS CELL CARCINOMA LEFT TEMPLE WITH FROZEN SECTION;  Surgeon: Kamilla Bruner MD;  Location: [facility];  Service: General;  Laterality: Left;    WISDOM TOOTH EXTRACTION         Family History:   Family History   Problem Relation Age of Onset    Arthritis Father     Stroke Mother     Hypertension Mother     Prostate cancer Neg Hx     Kidney cancer Neg Hx        Social History:   Social History     Socioeconomic History    Marital status: Single   Tobacco Use    Smoking status: Every Day     Current packs/day: 1.00     Average packs/day: 1 pack/day for 38.0 years (38.0 ttl pk-yrs)     Types: Cigars, Cigarettes     Start date: 1981     Last attempt to quit: 2004     Passive exposure: Past    Smokeless tobacco: Never   Vaping Use    Vaping status: Never Used   Substance and Sexual Activity    Alcohol use: Yes     Alcohol/week: 14.0 standard drinks of alcohol     Types: 14 Cans of beer per week     Comment: 14 beers weekly    Drug use: Never    Sexual activity: Yes     Partners: Female       Medications:     Current Outpatient Medications:     flecainide (TAMBOCOR) 50 MG tablet, Take 1 tablet by mouth Every 12 (Twelve) Hours., Disp: 180 tablet, Rfl: 3    HYDROcodone-acetaminophen (NORCO) 7.5-325 MG per tablet, Take 1 tablet by mouth Every 8 (Eight) Hours As Needed for Moderate Pain., Disp: , Rfl:     hydrOXYzine (ATARAX) 25 MG tablet, TAKE 1 TABLET BY MOUTH 3 TIMES A DAY AS NEEDED FOR ANXIETY., Disp: 90 tablet, Rfl: 1    metroNIDAZOLE (METROGEL) 1 % gel, APPLY A THIN LAYER TO THE FACE ONCE NIGHTLY AT BEDTIME., Disp: , Rfl:     naltrexone (DEPADE) 50 MG tablet, Take 1 tablet by mouth Daily., Disp: , Rfl:     omeprazole (priLOSEC) 40 MG capsule, Take 1 capsule by mouth Daily., Disp: 90 capsule, Rfl: 3    albuterol sulfate  (90 Base) MCG/ACT inhaler, Inhale 2 puffs Every 4 (Four) Hours As Needed for Wheezing or Shortness of Air. (Patient not taking: Reported on 1/14/2025), Disp: 18 g, Rfl: 0    metoprolol  succinate XL (TOPROL-XL) 25 MG 24 hr tablet, Take 1 tablet by mouth Daily. (Patient not taking: Reported on 1/14/2025), Disp: 90 tablet, Rfl: 3    nicotine polacrilex (RA Nicotine Gum) 4 MG gum, Chew 1 each As Needed for Smoking Cessation. (Patient not taking: Reported on 1/14/2025), Disp: 50 each, Rfl: 1    tadalafil (Cialis) 5 MG tablet, Take 1 tablet by mouth Daily As Needed for Erectile Dysfunction. (Patient not taking: Reported on 1/14/2025), Disp: 10 tablet, Rfl: 0    Allergies:   Allergies   Allergen Reactions    Penicillins Hives       Objective     Physical Exam:   Vital Signs:   Vitals:    01/14/25 0846   Pulse: 83   Resp: 15   Temp: 97.5 °F (36.4 °C)   TempSrc: Temporal   SpO2: 97%   Weight: 93.2 kg (205 lb 6.4 oz)     Body mass index is 27.85 kg/m².   Physical Exam  Vitals and nursing note reviewed.   Constitutional:       General: He is not in acute distress.     Appearance: Normal appearance. He is not ill-appearing.   HENT:      Head: Normocephalic and atraumatic.   Pulmonary:      Effort: Pulmonary effort is normal.   Skin:     General: Skin is warm and dry.   Neurological:      General: No focal deficit present.      Mental Status: He is alert and oriented to person, place, and time.   Psychiatric:         Mood and Affect: Mood normal.         Behavior: Behavior normal.      Labs  Brief Urine Lab Results       None          Lab Results   Component Value Date    GLUCOSE 96 10/01/2024    CALCIUM 9.4 10/01/2024     10/01/2024    K 4.4 10/01/2024    CO2 27.3 10/01/2024     10/01/2024    BUN 13 10/01/2024    CREATININE 0.73 (L) 10/01/2024    EGFRIFNONA 87 11/29/2018    BCR 17.8 10/01/2024    ANIONGAP 13.1 07/12/2022     Lab Results   Component Value Date    WBC 6.42 10/01/2024    HGB 17.2 10/01/2024    HCT 50.5 10/01/2024    MCV 96.9 10/01/2024     10/01/2024     Radiographic Studies  CT Chest Low Dose Cancer Screening WO  Result Date: 10/15/2024  2 left upper lobe nodules, largest  measuring 11 mm. Lung RADS category 4A.  RECOMMENDATIONS: PET/CT is recommended.    Images were reviewed, interpreted, and dictated by Dr. Ab Meeks MD Transcribed by Leah Lyons PA-C.  This report was signed and finalized on 10/15/2024 12:02 PM by Ab Meeks MD.      I have reviewed the above labs and imaging.     Assessment / Plan      Assessment/Plan: Mr. Markell Chilel is a 64 y.o. male who presents today with history of elevated PSA to 5.25 ng/mL and an approximately 10% risk of having an aggressive prostate cancer if biopsy were to be performed.  IPSS of 10 at his previous visit with no new complaints or urologic concerns.      He is here today to review his prostate MRI results which revealed a PIRADS 4 lesion in the left peripheral zone.  PSA density of 0.21.  No new or worsening urologic complaints at this time.      My recommendation is to proceed with a TRUS prostate biopsy.   I explained that sampling error can occur with any biopsy and there is a risk of potentially missing a cancer that may be present.    I discussed the risks, benefits, and alternatives to prostate biopsy, including hematuria, hematochezia, and hematospermia.  I also discussed the risk of diagnosing a clinically-insignificant prostate cancer.  I discussed the risks of sepsis, which can be minimized by prophylactic antibiotics.    Mr. Chilel wishes to proceed with TRUS prostate biopsy.  He would also like to have a cystoscopy and uroflow at the same time for BPH and bothersome LUTS.  Mr. Chilel wishes to have this done sometime in April due to his work schedule.  Will return for nurse visit 2 weeks prior to biopsy for rectal culture at that time.  Asks me to wait to prescribed medication until he comes for his nurse visit.      Questions/concerns addressed to patient's satisfaction.       Diagnoses and all orders for this visit:    1. Elevated prostate specific antigen (PSA) (Primary)    2. Lower urinary tract symptoms  (LUTS)      Follow Up:   Return for For Cystoscopy, uroflow AND TRUS prostate biopsy with Dr. Duarte. .    CRYSTAL Gtz, MSN, FNP-C  [facility] [place]

## 2025-01-16 ENCOUNTER — TELEPHONE (OUTPATIENT)
Dept: INTERNAL MEDICINE | Facility: CLINIC | Age: 65
End: 2025-01-16

## 2025-01-16 ENCOUNTER — OFFICE VISIT (OUTPATIENT)
Dept: INTERNAL MEDICINE | Facility: CLINIC | Age: 65
End: 2025-01-16
Payer: COMMERCIAL

## 2025-01-16 VITALS
DIASTOLIC BLOOD PRESSURE: 82 MMHG | TEMPERATURE: 98.6 F | BODY MASS INDEX: 27.63 KG/M2 | HEART RATE: 85 BPM | SYSTOLIC BLOOD PRESSURE: 144 MMHG | HEIGHT: 72 IN | WEIGHT: 204 LBS | OXYGEN SATURATION: 98 %

## 2025-01-16 DIAGNOSIS — N52.9 ERECTILE DYSFUNCTION, UNSPECIFIED ERECTILE DYSFUNCTION TYPE: ICD-10-CM

## 2025-01-16 DIAGNOSIS — Z20.818 EXPOSURE TO STREP THROAT: Primary | ICD-10-CM

## 2025-01-16 DIAGNOSIS — R09.81 SINUS CONGESTION: ICD-10-CM

## 2025-01-16 LAB
EXPIRATION DATE: NORMAL
INTERNAL CONTROL: NORMAL
Lab: NORMAL
S PYO AG THROAT QL: NEGATIVE

## 2025-01-16 PROCEDURE — 99214 OFFICE O/P EST MOD 30 MIN: CPT | Performed by: NURSE PRACTITIONER

## 2025-01-16 PROCEDURE — 87880 STREP A ASSAY W/OPTIC: CPT | Performed by: NURSE PRACTITIONER

## 2025-01-16 RX ORDER — CLINDAMYCIN HYDROCHLORIDE 300 MG/1
300 CAPSULE ORAL 3 TIMES DAILY
Qty: 30 CAPSULE | Refills: 0 | Status: SHIPPED | OUTPATIENT
Start: 2025-01-16 | End: 2025-01-26

## 2025-01-16 RX ORDER — AZITHROMYCIN 250 MG/1
TABLET, FILM COATED ORAL
Qty: 6 TABLET | Refills: 0 | Status: SHIPPED | OUTPATIENT
Start: 2025-01-16 | End: 2025-01-16

## 2025-01-16 RX ORDER — TADALAFIL 20 MG/1
20 TABLET ORAL DAILY PRN
Qty: 30 TABLET | Refills: 0 | Status: SHIPPED | OUTPATIENT
Start: 2025-01-16

## 2025-01-16 NOTE — TELEPHONE ENCOUNTER
Miguel with [facility] called and states that the Z pack and the Flecaninide interact with each other. He said it could cause QTC prolongation. He wants to know if another abx could be called in. Please advise. [phone]

## 2025-01-16 NOTE — PROGRESS NOTES
"  Office Visit      Patient Name: Markell Chilel  : 1960   MRN: [id number]   Care Team: Patient Care Team:  Tenisha Fuchs APRN as PCP - General (Family Medicine)  Kamilla Bruner MD as Surgeon (General Surgery)  Christian Real MD as Consulting Physician (Cardiology)    Chief Complaint  Earache (Right, popping, X couples days), Sinus Problem (Sinus pain and pressure), and Cough    Subjective     Subjective      Markell Chilel presents for URI symptoms.  Onset: yesterday  Endorses earache on the right, sinus pressure and congestion, body aches, fatigue   Wife tested positive for strep yesterday   Trip to [facility] on Monday  Wants to increase dose of Cialis     Objective     Objective   Vital Signs:   /82   Pulse 85   Temp 98.6 °F (37 °C)   Ht 182.9 cm (72.01\")   Wt 92.5 kg (204 lb)   SpO2 98%   BMI 27.66 kg/m²         Physical Exam  Constitutional:       General: He is not in acute distress.     Appearance: Normal appearance.   HENT:      Head: Normocephalic and atraumatic.      Right Ear: Ear canal and external ear normal. A middle ear effusion is present.      Left Ear: Ear canal and external ear normal. A middle ear effusion is present.      Nose: Nose normal. Congestion and rhinorrhea present.      Right Sinus: No maxillary sinus tenderness or frontal sinus tenderness.      Left Sinus: No maxillary sinus tenderness or frontal sinus tenderness.      Mouth/Throat:      Lips: Pink.      Mouth: Mucous membranes are moist.      Pharynx: Oropharynx is clear. Pharyngeal swelling and posterior oropharyngeal erythema present.   Eyes:      Extraocular Movements: Extraocular movements intact.      Pupils: Pupils are equal, round, and reactive to light.   Cardiovascular:      Rate and Rhythm: Normal rate and regular rhythm.      Heart sounds: Normal heart sounds.   Pulmonary:      Effort: Pulmonary effort is normal.      Breath sounds: Normal breath sounds.   Musculoskeletal:         " General: Normal range of motion.      Cervical back: Normal range of motion.   Skin:     General: Skin is warm and dry.   Neurological:      General: No focal deficit present.      Mental Status: He is alert and oriented to person, place, and time. Mental status is at baseline.   Psychiatric:         Mood and Affect: Mood normal.         Behavior: Behavior normal.         Thought Content: Thought content normal.        Office Visit on 01/16/2025   Component Date Value Ref Range Status    Rapid Strep A Screen 01/16/2025 Negative  Negative, VALID, INVALID, Not Performed Final    Internal Control 01/16/2025 Passed  Passed Final    Lot Number 01/16/2025 [id number]   Final    Expiration Date 01/16/2025 1/3/27   Final      Assessment / Plan      Assessment & Plan   Problem List Items Addressed This Visit    None  Visit Diagnoses       Exposure to strep throat    -  Primary    Relevant Medications    azithromycin (Zithromax Z-Jurgen) 250 MG tablet    Other Relevant Orders    POCT rapid strep A (Completed)    Sinus congestion        Erectile dysfunction        Relevant Medications    tadalafil (Cialis) 20 MG tablet          Strep negative. Treat for underlying strep due to exposure. Recommend plain mucinex otc. Push 2L water. Practice deep breathing exercises. Use humidifier in room if available.  Nasal saline rinses prn for congestion. Warm salt water gargles for sore throat. Home covid tests provided. F/u if symptoms persist/worsen.  Increase Cialis 20 mg daily prn     Follow Up   Return if symptoms worsen or fail to improve.  Patient was given instructions and counseling regarding his condition or for health maintenance advice. Please see specific information pulled into the AVS if appropriate.     CRYSTAL Valdez  [facility]

## 2025-01-16 NOTE — TELEPHONE ENCOUNTER
I sent clindamycin can cancel azithromycin.  I looked up clindamycin and there were no interactions with flecainide but they can let me know if prefer a different agent.

## 2025-03-13 ENCOUNTER — TELEPHONE (OUTPATIENT)
Dept: UROLOGY | Facility: CLINIC | Age: 65
End: 2025-03-13

## 2025-03-19 RX ORDER — METOPROLOL SUCCINATE 25 MG/1
25 TABLET, EXTENDED RELEASE ORAL DAILY
Qty: 90 TABLET | Refills: 3 | Status: SHIPPED | OUTPATIENT
Start: 2025-03-19

## 2025-04-01 ENCOUNTER — TELEPHONE (OUTPATIENT)
Dept: UROLOGY | Facility: CLINIC | Age: 65
End: 2025-04-01
Payer: COMMERCIAL

## 2025-04-01 DIAGNOSIS — R97.20 ELEVATED PROSTATE SPECIFIC ANTIGEN (PSA): Primary | ICD-10-CM

## 2025-04-01 RX ORDER — CIPROFLOXACIN 500 MG/1
500 TABLET, FILM COATED ORAL 2 TIMES DAILY
Qty: 6 TABLET | Refills: 0 | Status: SHIPPED | OUTPATIENT
Start: 2025-04-01

## 2025-04-01 RX ORDER — MAGNESIUM HYDROXIDE 1200 MG/15ML
1 LIQUID ORAL ONCE
Qty: 1 EACH | Refills: 0 | Status: SHIPPED | OUTPATIENT
Start: 2025-04-01 | End: 2025-04-01

## 2025-04-01 NOTE — TELEPHONE ENCOUNTER
Patient having biopsy on 5/12 needs antibiotics called in I got him a rectal swab nurse visit scheduled pharmacy is correct in chart

## 2025-04-01 NOTE — TELEPHONE ENCOUNTER
"     Hub staff attempted to follow warm transfer process and was unsuccessful     Caller: Markell Chilel \"Alec\"     Relationship to patient: SELF    Best call back number:[phone]     Patient is needing: PT HAS AN APPT FOR A BIOPSY ON 4-3-25. IT WAS ORIGINALLY SCHEDULED FOR 1PM BUT CHANGED TO 9:30AM. PT CAN'T MAKE IT AND NEEDS TO RESCHEDULE. PT CANNOT DO MORNING APPTS,     HE IS ASKING FOR AN AFTERNOON APPT TOWARDS THE END OF ARPIL/BEGINNING OF MAY. PLEASE GIVE PT A CALL BACK.  "

## 2025-06-16 RX ORDER — OMEPRAZOLE 40 MG/1
40 CAPSULE, DELAYED RELEASE ORAL DAILY
Qty: 90 CAPSULE | Refills: 3 | OUTPATIENT
Start: 2025-06-16

## 2025-06-24 RX ORDER — OMEPRAZOLE 40 MG/1
40 CAPSULE, DELAYED RELEASE ORAL DAILY
Qty: 90 CAPSULE | Refills: 3 | Status: SHIPPED | OUTPATIENT
Start: 2025-06-24

## 2025-06-24 NOTE — TELEPHONE ENCOUNTER
"  Caller: Markell Chilel \"Alec\"    Relationship: Self    Best call back number: [phone]     Requested Prescriptions:   Requested Prescriptions     Pending Prescriptions Disp Refills    omeprazole (priLOSEC) 40 MG capsule 90 capsule 3     Sig: Take 1 capsule by mouth Daily.        Pharmacy where request should be sent: [id number] - [place], [place] - [address] [phone] [facility] [phone]      Last office visit with prescribing clinician: 1/16/2025   Last telemedicine visit with prescribing clinician: Visit date not found   Next office visit with prescribing clinician: Visit date not found     Additional details provided by patient: PATIENT OUT OF MEDICATION    Does the patient have less than a 3 day supply:  [x] Yes  [] No    Would you like a call back once the refill request has been completed: [] Yes [x] No    If the office needs to give you a call back, can they leave a voicemail: [] Yes [x] No    Brianne Cat   06/24/25 16:08 EDT       "

## 2025-07-18 RX ORDER — FLECAINIDE ACETATE 50 MG/1
50 TABLET ORAL EVERY 12 HOURS
Qty: 180 TABLET | Refills: 3 | OUTPATIENT
Start: 2025-07-18

## 2025-08-13 ENCOUNTER — OFFICE VISIT (OUTPATIENT)
Dept: INTERNAL MEDICINE | Facility: CLINIC | Age: 65
End: 2025-08-13
Payer: COMMERCIAL

## 2025-08-13 VITALS
OXYGEN SATURATION: 99 % | RESPIRATION RATE: 20 BRPM | TEMPERATURE: 98.9 F | DIASTOLIC BLOOD PRESSURE: 65 MMHG | HEART RATE: 85 BPM | WEIGHT: 202 LBS | BODY MASS INDEX: 27.36 KG/M2 | SYSTOLIC BLOOD PRESSURE: 142 MMHG | HEIGHT: 72 IN

## 2025-08-13 DIAGNOSIS — H65.192 ACUTE EFFUSION OF LEFT EAR: ICD-10-CM

## 2025-08-13 DIAGNOSIS — H81.12 BENIGN PAROXYSMAL POSITIONAL VERTIGO OF LEFT EAR: ICD-10-CM

## 2025-08-13 DIAGNOSIS — I48.0 PAROXYSMAL ATRIAL FIBRILLATION: ICD-10-CM

## 2025-08-13 DIAGNOSIS — R52 GENERALIZED BODY ACHES: Primary | ICD-10-CM

## 2025-08-13 LAB
EXPIRATION DATE: NORMAL
FLUAV AG UPPER RESP QL IA.RAPID: NOT DETECTED
FLUBV AG UPPER RESP QL IA.RAPID: NOT DETECTED
INTERNAL CONTROL: NORMAL
Lab: NORMAL
SARS-COV-2 AG UPPER RESP QL IA.RAPID: NOT DETECTED

## 2025-08-13 PROCEDURE — 87428 SARSCOV & INF VIR A&B AG IA: CPT | Performed by: INTERNAL MEDICINE

## 2025-08-13 PROCEDURE — 99214 OFFICE O/P EST MOD 30 MIN: CPT | Performed by: INTERNAL MEDICINE

## 2025-08-13 RX ORDER — PREDNISONE 20 MG/1
40 TABLET ORAL DAILY
Qty: 10 TABLET | Refills: 0 | Status: SHIPPED | OUTPATIENT
Start: 2025-08-13

## 2025-08-13 RX ORDER — FLECAINIDE ACETATE 50 MG/1
1 TABLET ORAL EVERY 12 HOURS SCHEDULED
COMMUNITY
Start: 2025-04-23

## (undated) DEVICE — DRSNG PAD ABD 8X10IN STRL

## (undated) DEVICE — PATIENT RETURN ELECTRODE, SINGLE-USE, CONTACT QUALITY MONITORING, ADULT, WITH 9FT CORD, FOR PATIENTS WEIGING OVER 33LBS. (15KG): Brand: MEGADYNE

## (undated) DEVICE — ARM SLING II: Brand: DEROYAL

## (undated) DEVICE — SUCTION CANISTER, 1500CC, RIGID: Brand: DEROYAL

## (undated) DEVICE — SPNG GZ STRL 2S 4X4 12PLY

## (undated) DEVICE — SYR LUER SLPTP 50ML

## (undated) DEVICE — OCCLUSIVE GAUZE STRIP,3% BISMUTH TRIBROMOPHENATE IN PETROLATUM BLEND: Brand: XEROFORM

## (undated) DEVICE — CUFF SCD HEMOFORCE SEQ CALF STD MD

## (undated) DEVICE — 3M™ STERI-DRAPE™ U-DRAPE 1015: Brand: STERI-DRAPE™

## (undated) DEVICE — 1000 S-DRAPE TOWEL DRAPE 10/BX: Brand: STERI-DRAPE™

## (undated) DEVICE — NDL SPINE 18G 31/2IN PNK

## (undated) DEVICE — UNDYED BRAIDED (POLYGLACTIN 910), SYNTHETIC ABSORBABLE SUTURE: Brand: COATED VICRYL

## (undated) DEVICE — STRIP,CLOSURE,WOUND,MEDI-STRIP,1/2X4: Brand: MEDLINE

## (undated) DEVICE — FRCP BX RADJAW4 NDL 2.8 240 STD OG

## (undated) DEVICE — SUT VIC 2/0 SH 27IN

## (undated) DEVICE — WRAP SHOULDER COLD THERAPY

## (undated) DEVICE — SUT VIC 3/0 SH 27IN J416H

## (undated) DEVICE — SHEET,DRAPE,70X100,STERILE: Brand: MEDLINE

## (undated) DEVICE — FLEXIBLE YANKAUER,MEDIUM TIP, NO VACUUM CONTROL: Brand: ARGYLE

## (undated) DEVICE — GLV SURG ULTRATOUCH BIOGEL/COAT PF LF SZ6 STRL

## (undated) DEVICE — 4.5 MM FULL RADIUS STRAIGHT                                    BLADES, POWER/EP-1, YELLOW, PACKAGED                                    6 PER BOX, STERILE: Brand: DYONICS

## (undated) DEVICE — GLV SURG SENSICARE GREEN W/ALOE PF LF 6 STRL

## (undated) DEVICE — VIOLET BRAIDED (POLYGLACTIN 910), SYNTHETIC ABSORBABLE SUTURE: Brand: COATED VICRYL

## (undated) DEVICE — SLV SCD CALF HEMOFORCE DVT THERP REPROC MD

## (undated) DEVICE — TBG INFLOW FMS DUO W/O 1WY VLV

## (undated) DEVICE — VLV SXN AIR/H2O ORCAPOD3 1P/U STRL

## (undated) DEVICE — YANKAUER,BULB TIP, NO VENT: Brand: ARGYLE

## (undated) DEVICE — SUT PDS 5/0 P3 18IN CLR PDP493G

## (undated) DEVICE — SPNG GZ WOVN 4X4IN 12PLY 10/BX STRL

## (undated) DEVICE — DRSNG WND GZ PAD BORDERED LF 4X5IN STRL

## (undated) DEVICE — SYR LL TP 10ML STRL

## (undated) DEVICE — LUBE JELLY PK/2.75GM STRL BX/144

## (undated) DEVICE — NDL HYPO ECLPS SFTY 25G 1 1/2IN

## (undated) DEVICE — INTENDED FOR TISSUE SEPARATION, AND OTHER PROCEDURES THAT REQUIRE A SHARP SURGICAL BLADE TO PUNCTURE OR CUT.: Brand: BARD-PARKER ® CARBON RIB-BACK BLADES

## (undated) DEVICE — GLV SURG TRIUMPH ORTHO W/ALOE PF LTX 8 STRL

## (undated) DEVICE — IRRIGATOR BULB ASEPTO 60CC STRL

## (undated) DEVICE — DRSNG WND GZ PAD BORDERED 4X8IN STRL

## (undated) DEVICE — ADHS LIQ MASTISOL 2/3ML

## (undated) DEVICE — NDL HYPO ECLPS SFTY 22G 1 1/2IN

## (undated) DEVICE — GLV SURG SENSICARE W/ALOE PF LF 8 STRL

## (undated) DEVICE — DRSNG TELFA PAD NONADH STR 1S 3X8IN

## (undated) DEVICE — RICH NECK PROCEDURE: Brand: MEDLINE INDUSTRIES, INC.

## (undated) DEVICE — NDL MAYO CATGUT 1/2 CIR 18244D PK/2

## (undated) DEVICE — CONMED SCOPE SAVER BITE BLOCK, 20X27 MM: Brand: SCOPE SAVER

## (undated) DEVICE — SYR LUERLOK 30CC

## (undated) DEVICE — DRSNG SURESITE123 6X8IN

## (undated) DEVICE — 4.0 MM ACROMIONIZER STRAIGHT                                    BURRS, POWER/EP-1, MAUVE, 8000                                    MAXIMUM RPM, PACKAGED 6 PER BOX, STERILE: Brand: DYONICS

## (undated) DEVICE — NDL HYPO ECLPS SFTY 18G 1 1/2IN

## (undated) DEVICE — GLV SURG SENSICARE W/ALOE PF LF 6.5 STRL

## (undated) DEVICE — Device

## (undated) DEVICE — PENCL ES MEGADINE EZ/CLEAN BUTN W/HOLSTR 10FT

## (undated) DEVICE — SUT ETHLN 3/0 FS1 663G

## (undated) DEVICE — PK HIP GEN 20